# Patient Record
Sex: FEMALE | Race: WHITE | Employment: UNEMPLOYED | URBAN - METROPOLITAN AREA
[De-identification: names, ages, dates, MRNs, and addresses within clinical notes are randomized per-mention and may not be internally consistent; named-entity substitution may affect disease eponyms.]

---

## 2017-03-13 ENCOUNTER — OFFICE VISIT (OUTPATIENT)
Dept: INTERNAL MEDICINE CLINIC | Facility: CLINIC | Age: 37
End: 2017-03-13

## 2017-03-13 VITALS
SYSTOLIC BLOOD PRESSURE: 116 MMHG | BODY MASS INDEX: 26.24 KG/M2 | HEART RATE: 73 BPM | WEIGHT: 139 LBS | HEIGHT: 61 IN | DIASTOLIC BLOOD PRESSURE: 78 MMHG | OXYGEN SATURATION: 98 %

## 2017-03-13 DIAGNOSIS — B35.1 ONYCHOMYCOSIS: ICD-10-CM

## 2017-03-13 DIAGNOSIS — L50.9 HIVES: Primary | ICD-10-CM

## 2017-03-13 DIAGNOSIS — B08.5 ENTEROVIRAL VESICULAR PHARYNGITIS: ICD-10-CM

## 2017-03-13 DIAGNOSIS — Z00.00 ANNUAL PHYSICAL EXAM: ICD-10-CM

## 2017-03-13 PROCEDURE — 82785 ASSAY OF IGE: CPT | Performed by: FAMILY MEDICINE

## 2017-03-13 PROCEDURE — 99213 OFFICE O/P EST LOW 20 MIN: CPT | Performed by: FAMILY MEDICINE

## 2017-03-13 PROCEDURE — 36415 COLL VENOUS BLD VENIPUNCTURE: CPT | Performed by: FAMILY MEDICINE

## 2017-03-13 PROCEDURE — 86003 ALLG SPEC IGE CRUDE XTRC EA: CPT | Performed by: FAMILY MEDICINE

## 2017-03-13 NOTE — PROGRESS NOTES
CC:  Derm Problem      Hx of CC:  1-2 WEEKS WITH ITCHING AND RED PATCHES ON LEFT TEMPLE AREA AND NECK. NO KNOWN TRIGGER.   RESOLVING URI/PHARYNGITIS  THICKENED DISCOLORED RIGHT MID TOENAIL    Vitals:    03/13/17  1205   BP: 116/78   Pulse: 73   Height: 61\

## 2017-03-16 LAB
CLAM IGE QN: <0.1 KUA/L (ref ?–0.1)
CODFISH IGE QN: <0.1 KUA/L (ref ?–0.1)
CORN IGE QN: <0.1 KUA/L (ref ?–0.1)
COW MILK IGE QN: <0.1 KUA/L (ref ?–0.1)
EGG WHITE IGE QN: <0.1 KUA/L (ref ?–0.1)
IGE SERPL-ACNC: 6.36 KU/L (ref 2–214)
PEANUT IGE QN: <0.1 KUA/L (ref ?–0.1)
SCALLOP IGE QN: <0.1 KUA/L (ref ?–0.1)
SESAME SEED IGE QN: <0.1 KUA/L (ref ?–0.1)
SHRIMP IGE QN: <0.1 KUA/L (ref ?–0.1)
SOYBEAN IGE QN: <0.1 KUA/L (ref ?–0.1)
WALNUT IGE QN: <0.1 KUA/L (ref ?–0.1)
WHEAT IGE QN: <0.1 KUA/L (ref ?–0.1)

## 2017-12-26 ENCOUNTER — OFFICE VISIT (OUTPATIENT)
Dept: INTERNAL MEDICINE CLINIC | Facility: CLINIC | Age: 37
End: 2017-12-26

## 2017-12-26 VITALS
SYSTOLIC BLOOD PRESSURE: 112 MMHG | OXYGEN SATURATION: 99 % | WEIGHT: 140 LBS | HEART RATE: 71 BPM | RESPIRATION RATE: 16 BRPM | HEIGHT: 61 IN | DIASTOLIC BLOOD PRESSURE: 62 MMHG | BODY MASS INDEX: 26.43 KG/M2

## 2017-12-26 DIAGNOSIS — F33.8 SEASONAL AFFECTIVE DISORDER (HCC): ICD-10-CM

## 2017-12-26 DIAGNOSIS — R53.82 CHRONIC FATIGUE: ICD-10-CM

## 2017-12-26 DIAGNOSIS — Z00.00 ANNUAL PHYSICAL EXAM: ICD-10-CM

## 2017-12-26 DIAGNOSIS — Z30.09 FAMILY PLANNING: Primary | ICD-10-CM

## 2017-12-26 PROCEDURE — 99213 OFFICE O/P EST LOW 20 MIN: CPT | Performed by: FAMILY MEDICINE

## 2017-12-27 PROBLEM — F33.8 SEASONAL AFFECTIVE DISORDER (HCC): Status: ACTIVE | Noted: 2017-12-27

## 2017-12-27 PROBLEM — F33.8 SEASONAL AFFECTIVE DISORDER: Status: ACTIVE | Noted: 2017-12-27

## 2017-12-27 NOTE — PROGRESS NOTES
CC:  Contraception (Pt presents to clinic for birth control consult-->requesting Dasetta pill.)      Hx of CC:  ON ABOVE. LAST PAP ABOUT 2.5 YEARS AGO AT [facility]--NORMAL. SOMEWHAT \"BLAH\" AND TIRED-->MORE SO DURING CHRISTENSEN.   NO SIGNIFICANT FAMILY/LIFE

## 2019-01-07 ENCOUNTER — OFFICE VISIT (OUTPATIENT)
Dept: INTERNAL MEDICINE CLINIC | Facility: CLINIC | Age: 39
End: 2019-01-07
Payer: COMMERCIAL

## 2019-01-07 VITALS
SYSTOLIC BLOOD PRESSURE: 110 MMHG | HEIGHT: 61 IN | RESPIRATION RATE: 17 BRPM | WEIGHT: 134 LBS | BODY MASS INDEX: 25.3 KG/M2 | DIASTOLIC BLOOD PRESSURE: 70 MMHG | OXYGEN SATURATION: 100 % | HEART RATE: 91 BPM

## 2019-01-07 DIAGNOSIS — Z30.09 FAMILY PLANNING: ICD-10-CM

## 2019-01-07 DIAGNOSIS — Z01.419 ENCOUNTER FOR GYNECOLOGICAL EXAMINATION WITHOUT ABNORMAL FINDING: Primary | ICD-10-CM

## 2019-01-07 PROCEDURE — 87591 N.GONORRHOEAE DNA AMP PROB: CPT | Performed by: FAMILY MEDICINE

## 2019-01-07 PROCEDURE — 99395 PREV VISIT EST AGE 18-39: CPT | Performed by: FAMILY MEDICINE

## 2019-01-07 PROCEDURE — 87491 CHLMYD TRACH DNA AMP PROBE: CPT | Performed by: FAMILY MEDICINE

## 2019-01-08 LAB
C TRACH DNA SPEC QL NAA+PROBE: NEGATIVE
N GONORRHOEA DNA SPEC QL NAA+PROBE: NEGATIVE
PAP HISTORY (OTHER THAN LAST PAP): NORMAL

## 2019-01-09 NOTE — PROGRESS NOTES
HPI:   Monique Evans is a 45year old female who presents for a complete physical exam. NOT FASTING CURRENTLY  Last pap:  > 3 YEARS AGO   Menses:  MONTHLY  Contraception:  BCP  Family hx of breast, ovarian, cervical or colon CA:  NONE        Wt Readings from Pulse 91   Resp 17   Ht 61\"   Wt 134 lb   LMP 12/20/2018   SpO2 100%   BMI 25.32 kg/m²   Body mass index is 25.32 kg/m².    GENERAL: well developed, well nourished,in no apparent distress  SKIN: no rashes,no suspicious lesions  HEENT: atraumatic, normoceph

## 2020-03-05 ENCOUNTER — OFFICE VISIT (OUTPATIENT)
Dept: INTERNAL MEDICINE CLINIC | Facility: CLINIC | Age: 40
End: 2020-03-05
Payer: COMMERCIAL

## 2020-03-05 VITALS
HEART RATE: 71 BPM | WEIGHT: 139 LBS | HEIGHT: 61 IN | DIASTOLIC BLOOD PRESSURE: 66 MMHG | OXYGEN SATURATION: 98 % | RESPIRATION RATE: 17 BRPM | BODY MASS INDEX: 26.24 KG/M2 | SYSTOLIC BLOOD PRESSURE: 108 MMHG

## 2020-03-05 DIAGNOSIS — M75.81 PECTORALIS MAJOR TENDINITIS, RIGHT: ICD-10-CM

## 2020-03-05 DIAGNOSIS — N64.4 MASTALGIA IN FEMALE: Primary | ICD-10-CM

## 2020-03-05 PROCEDURE — 99213 OFFICE O/P EST LOW 20 MIN: CPT | Performed by: FAMILY MEDICINE

## 2020-09-08 ENCOUNTER — OFFICE VISIT (OUTPATIENT)
Dept: INTERNAL MEDICINE CLINIC | Facility: CLINIC | Age: 40
End: 2020-09-08
Payer: COMMERCIAL

## 2020-09-08 VITALS
HEART RATE: 72 BPM | OXYGEN SATURATION: 98 % | BODY MASS INDEX: 28.25 KG/M2 | SYSTOLIC BLOOD PRESSURE: 128 MMHG | WEIGHT: 149.63 LBS | HEIGHT: 61 IN | DIASTOLIC BLOOD PRESSURE: 77 MMHG

## 2020-09-08 DIAGNOSIS — Z00.00 ANNUAL PHYSICAL EXAM: ICD-10-CM

## 2020-09-08 DIAGNOSIS — Z12.31 VISIT FOR SCREENING MAMMOGRAM: Primary | ICD-10-CM

## 2020-09-08 DIAGNOSIS — N64.4 MASTALGIA IN FEMALE: ICD-10-CM

## 2020-09-08 PROCEDURE — 3008F BODY MASS INDEX DOCD: CPT | Performed by: FAMILY MEDICINE

## 2020-09-08 PROCEDURE — 3074F SYST BP LT 130 MM HG: CPT | Performed by: FAMILY MEDICINE

## 2020-09-08 PROCEDURE — 99213 OFFICE O/P EST LOW 20 MIN: CPT | Performed by: FAMILY MEDICINE

## 2020-09-08 PROCEDURE — 3078F DIAST BP <80 MM HG: CPT | Performed by: FAMILY MEDICINE

## 2020-09-08 NOTE — PROGRESS NOTES
CC:  Breast Pain (Pt presents for c/c bilat breast pain x a few months)      Hx of CC:  SEVERAL MONTHS WITH EPISODIC BILATERAL BREAST TENDERNESS-->HAS NOT PAID ATTENTION IF SYMPTOMS CYCLICAL OR MID-CYCLE.       Vitals:    09/08/20  1315   BP: 128/77   Pulse

## 2022-10-31 ENCOUNTER — OFFICE VISIT (OUTPATIENT)
Dept: INTERNAL MEDICINE CLINIC | Facility: CLINIC | Age: 42
End: 2022-10-31
Payer: COMMERCIAL

## 2022-10-31 VITALS
OXYGEN SATURATION: 98 % | RESPIRATION RATE: 15 BRPM | BODY MASS INDEX: 25.3 KG/M2 | TEMPERATURE: 98 F | HEIGHT: 61 IN | SYSTOLIC BLOOD PRESSURE: 114 MMHG | DIASTOLIC BLOOD PRESSURE: 66 MMHG | HEART RATE: 71 BPM | WEIGHT: 134 LBS

## 2022-10-31 DIAGNOSIS — E66.3 OVERWEIGHT (BMI 25.0-29.9): ICD-10-CM

## 2022-10-31 DIAGNOSIS — Z23 NEED FOR INFLUENZA VACCINATION: ICD-10-CM

## 2022-10-31 DIAGNOSIS — R10.84 GENERALIZED ABDOMINAL PAIN: ICD-10-CM

## 2022-10-31 DIAGNOSIS — L98.9 SKIN LESION: ICD-10-CM

## 2022-10-31 DIAGNOSIS — Z00.00 HEALTH MAINTENANCE EXAMINATION: Primary | ICD-10-CM

## 2022-10-31 DIAGNOSIS — E78.00 PURE HYPERCHOLESTEROLEMIA: ICD-10-CM

## 2022-10-31 PROBLEM — F33.8 SEASONAL AFFECTIVE DISORDER: Status: RESOLVED | Noted: 2017-12-27 | Resolved: 2022-10-31

## 2022-10-31 PROBLEM — R10.10 PAIN OF UPPER ABDOMEN: Status: ACTIVE | Noted: 2022-10-31

## 2022-10-31 PROBLEM — F33.8 SEASONAL AFFECTIVE DISORDER (HCC): Status: RESOLVED | Noted: 2017-12-27 | Resolved: 2022-10-31

## 2022-10-31 LAB
ALBUMIN SERPL-MCNC: 3.8 G/DL (ref 3.4–5)
ALBUMIN/GLOB SERPL: 0.9 {RATIO} (ref 1–2)
ALP LIVER SERPL-CCNC: 92 U/L
ALT SERPL-CCNC: 62 U/L
ANION GAP SERPL CALC-SCNC: 9 MMOL/L (ref 0–18)
AST SERPL-CCNC: 31 U/L (ref 15–37)
BASOPHILS # BLD AUTO: 0.04 X10(3) UL (ref 0–0.2)
BASOPHILS NFR BLD AUTO: 0.4 %
BILIRUB SERPL-MCNC: 0.4 MG/DL (ref 0.1–2)
BUN BLD-MCNC: 14 MG/DL (ref 7–18)
BUN/CREAT SERPL: 20.6 (ref 10–20)
CALCIUM BLD-MCNC: 9.2 MG/DL (ref 8.5–10.1)
CHLORIDE SERPL-SCNC: 104 MMOL/L (ref 98–112)
CHOLEST SERPL-MCNC: 197 MG/DL (ref ?–200)
CO2 SERPL-SCNC: 24 MMOL/L (ref 21–32)
CREAT BLD-MCNC: 0.68 MG/DL
DEPRECATED RDW RBC AUTO: 48.9 FL (ref 35.1–46.3)
EOSINOPHIL # BLD AUTO: 0.03 X10(3) UL (ref 0–0.7)
EOSINOPHIL NFR BLD AUTO: 0.3 %
ERYTHROCYTE [DISTWIDTH] IN BLOOD BY AUTOMATED COUNT: 13.7 % (ref 11–15)
EST. AVERAGE GLUCOSE BLD GHB EST-MCNC: 111 MG/DL (ref 68–126)
FASTING PATIENT LIPID ANSWER: NO
FASTING STATUS PATIENT QL REPORTED: NO
GFR SERPLBLD BASED ON 1.73 SQ M-ARVRAT: 111 ML/MIN/1.73M2 (ref 60–?)
GLOBULIN PLAS-MCNC: 4.2 G/DL (ref 2.8–4.4)
GLUCOSE BLD-MCNC: 78 MG/DL (ref 70–99)
HBA1C MFR BLD: 5.5 % (ref ?–5.7)
HCT VFR BLD AUTO: 41.7 %
HCV AB SERPL QL IA: NONREACTIVE
HDLC SERPL-MCNC: 74 MG/DL (ref 40–59)
HGB BLD-MCNC: 13.4 G/DL
IMM GRANULOCYTES # BLD AUTO: 0.03 X10(3) UL (ref 0–1)
IMM GRANULOCYTES NFR BLD: 0.3 %
LDLC SERPL CALC-MCNC: 105 MG/DL (ref ?–100)
LYMPHOCYTES # BLD AUTO: 3.31 X10(3) UL (ref 1–4)
LYMPHOCYTES NFR BLD AUTO: 29.4 %
MCH RBC QN AUTO: 30.9 PG (ref 26–34)
MCHC RBC AUTO-ENTMCNC: 32.1 G/DL (ref 31–37)
MCV RBC AUTO: 96.3 FL
MONOCYTES # BLD AUTO: 0.73 X10(3) UL (ref 0.1–1)
MONOCYTES NFR BLD AUTO: 6.5 %
NEUTROPHILS # BLD AUTO: 7.12 X10 (3) UL (ref 1.5–7.7)
NEUTROPHILS # BLD AUTO: 7.12 X10(3) UL (ref 1.5–7.7)
NEUTROPHILS NFR BLD AUTO: 63.1 %
NONHDLC SERPL-MCNC: 123 MG/DL (ref ?–130)
OSMOLALITY SERPL CALC.SUM OF ELEC: 283 MOSM/KG (ref 275–295)
PLATELET # BLD AUTO: 250 10(3)UL (ref 150–450)
POTASSIUM SERPL-SCNC: 3.9 MMOL/L (ref 3.5–5.1)
PROT SERPL-MCNC: 8 G/DL (ref 6.4–8.2)
RBC # BLD AUTO: 4.33 X10(6)UL
SODIUM SERPL-SCNC: 137 MMOL/L (ref 136–145)
TRIGL SERPL-MCNC: 105 MG/DL (ref 30–149)
TSI SER-ACNC: 1.57 MIU/ML (ref 0.36–3.74)
VLDLC SERPL CALC-MCNC: 18 MG/DL (ref 0–30)
WBC # BLD AUTO: 11.3 X10(3) UL (ref 4–11)

## 2022-10-31 PROCEDURE — 83036 HEMOGLOBIN GLYCOSYLATED A1C: CPT | Performed by: FAMILY MEDICINE

## 2022-10-31 PROCEDURE — 3074F SYST BP LT 130 MM HG: CPT | Performed by: FAMILY MEDICINE

## 2022-10-31 PROCEDURE — 3008F BODY MASS INDEX DOCD: CPT | Performed by: FAMILY MEDICINE

## 2022-10-31 PROCEDURE — 86803 HEPATITIS C AB TEST: CPT | Performed by: FAMILY MEDICINE

## 2022-10-31 PROCEDURE — 80050 GENERAL HEALTH PANEL: CPT | Performed by: FAMILY MEDICINE

## 2022-10-31 PROCEDURE — 80061 LIPID PANEL: CPT | Performed by: FAMILY MEDICINE

## 2022-10-31 PROCEDURE — 3078F DIAST BP <80 MM HG: CPT | Performed by: FAMILY MEDICINE

## 2022-10-31 PROCEDURE — 90471 IMMUNIZATION ADMIN: CPT | Performed by: FAMILY MEDICINE

## 2022-10-31 PROCEDURE — 90686 IIV4 VACC NO PRSV 0.5 ML IM: CPT | Performed by: FAMILY MEDICINE

## 2022-10-31 PROCEDURE — 99396 PREV VISIT EST AGE 40-64: CPT | Performed by: FAMILY MEDICINE

## 2022-10-31 RX ORDER — MULTIVIT-MIN/IRON FUM/FOLIC AC 7.5 MG-4
1 TABLET ORAL DAILY
COMMUNITY

## 2022-10-31 RX ORDER — PHENTERMINE HYDROCHLORIDE 30 MG/1
30 CAPSULE ORAL EVERY MORNING
COMMUNITY

## 2022-10-31 RX ORDER — MULTIVIT WITH MINERALS/LUTEIN
1000 TABLET ORAL DAILY
COMMUNITY

## 2022-10-31 NOTE — ASSESSMENT & PLAN NOTE
Exercise and diet advised. CBC, CMP, lipid panel, Hba1c, TSH,  hepatitis C screen  Flu shot today. Advanced directive information provided. Advised COVID vaccine booster - can get after mammogram.  Pap smear deferred - patient reports she will reschedule to get done  Mammogram ordered and scheduled.

## 2022-10-31 NOTE — ASSESSMENT & PLAN NOTE
Check labs. Currently on phentermine provided by outside provider  Explained to her that ideally she should not be on phentermine at this time. Her weight is not significantly elevated enough where I think phentermine would be helpful for her. Instead, I recommend making healthy choices in her life-healthy diet and exercise emphasized. Follow-up as needed.

## 2022-10-31 NOTE — ASSESSMENT & PLAN NOTE
Patient with intermittent abdominal pain and back pain  Examination is unremarkable today  Advised just monitoring her symptoms for now. Can use Tylenol as needed. Healthy diet and exercise discussed.   Follow-up as needed

## 2022-10-31 NOTE — PATIENT INSTRUCTIONS
PATIENT INSTRUCTIONS    Thank you for seeing me today, it was a pleasure taking care of you. Please check out at the  and schedule a follow up appointment. Return in about 2 weeks (around 11/14/2022) for follow up. Please get your labs drawn - you may need to schedule a lab appointment if this was not completed at your recent doctor's visit. I will go over your test results with you when I see you in a few weeks  The following imaging studies were ordered: Mammogram 11/7 at 12 pm at [facility]  Please also follow up with the following specialists: Dermatology  Please fill out the advance directive information (power of  documents) and bring a copy to our clinic.   Focus on healthy diet and exercise  Consider discontinuing phentermine and focusing on lifestyle modifications instead  Monitor your abdominal pain for now, can take tylenol as needed      Best,   Dr. Kev Renee

## 2022-10-31 NOTE — ASSESSMENT & PLAN NOTE
Patient with a papule seen on the right chin that she reports has drained fluid before  She would like a referral to dermatology to further evaluate-provided

## 2022-11-01 PROBLEM — R74.8 ELEVATED LIVER ENZYMES: Status: ACTIVE | Noted: 2022-11-01

## 2022-11-07 ENCOUNTER — HOSPITAL ENCOUNTER (OUTPATIENT)
Dept: MAMMOGRAPHY | Facility: HOSPITAL | Age: 42
Discharge: HOME | End: 2022-11-07
Attending: FAMILY MEDICINE
Payer: COMMERCIAL

## 2022-11-07 DIAGNOSIS — Z00.00 HEALTH MAINTENANCE EXAMINATION: ICD-10-CM

## 2022-11-07 PROCEDURE — 77063 BREAST TOMOSYNTHESIS BI: CPT | Performed by: FAMILY MEDICINE

## 2022-11-07 PROCEDURE — 77067 SCR MAMMO BI INCL CAD: CPT | Performed by: FAMILY MEDICINE

## 2022-11-10 ENCOUNTER — OFFICE VISIT (OUTPATIENT)
Dept: INTERNAL MEDICINE CLINIC | Facility: CLINIC | Age: 42
End: 2022-11-10
Payer: COMMERCIAL

## 2022-11-10 VITALS
RESPIRATION RATE: 16 BRPM | HEIGHT: 61 IN | OXYGEN SATURATION: 98 % | BODY MASS INDEX: 25.3 KG/M2 | DIASTOLIC BLOOD PRESSURE: 72 MMHG | WEIGHT: 134 LBS | TEMPERATURE: 98 F | HEART RATE: 98 BPM | SYSTOLIC BLOOD PRESSURE: 110 MMHG

## 2022-11-10 DIAGNOSIS — Z00.00 HEALTH MAINTENANCE EXAMINATION: ICD-10-CM

## 2022-11-10 DIAGNOSIS — E78.00 PURE HYPERCHOLESTEROLEMIA: ICD-10-CM

## 2022-11-10 DIAGNOSIS — R74.8 ELEVATED LIVER ENZYMES: ICD-10-CM

## 2022-11-10 DIAGNOSIS — H53.9 VISUAL CHANGES: ICD-10-CM

## 2022-11-10 DIAGNOSIS — R10.84 GENERALIZED ABDOMINAL PAIN: ICD-10-CM

## 2022-11-10 DIAGNOSIS — E66.3 OVERWEIGHT (BMI 25.0-29.9): ICD-10-CM

## 2022-11-10 DIAGNOSIS — N64.4 BREAST PAIN, RIGHT: Primary | ICD-10-CM

## 2022-11-10 PROCEDURE — 3078F DIAST BP <80 MM HG: CPT | Performed by: FAMILY MEDICINE

## 2022-11-10 PROCEDURE — 99214 OFFICE O/P EST MOD 30 MIN: CPT | Performed by: FAMILY MEDICINE

## 2022-11-10 PROCEDURE — 3074F SYST BP LT 130 MM HG: CPT | Performed by: FAMILY MEDICINE

## 2022-11-10 PROCEDURE — 3008F BODY MASS INDEX DOCD: CPT | Performed by: FAMILY MEDICINE

## 2022-11-10 NOTE — ASSESSMENT & PLAN NOTE
Patient reports that her vision has been getting slightly worse over the years, now requiring reading glasses. Also has questions about light brown spot on her right eye  Will refer to ophthalmology to evaluate.

## 2022-11-10 NOTE — ASSESSMENT & PLAN NOTE
Patient with intermittent abdominal pain and back pain. Examination more with tenderness in the right lower rib area today. Advised just monitoring her symptoms for now. Can use Tylenol as needed. Healthy diet and exercise discussed.   Follow-up as needed

## 2022-11-10 NOTE — PATIENT INSTRUCTIONS
PATIENT INSTRUCTIONS    Thank you for seeing me today, it was a pleasure taking care of you. Please check out at the  and schedule a follow up appointment. Return in about 1 year (around 11/10/2023) for physical.  Please work on healthy diet and exercise  Monitor the pain in your right breast, hopefully this improves in time.  Can take tylenol or ibuprofen as needed for now  If you feel a lump, can come back and see me so that I can evaluate  Can see eye doctor for evaluation of your vision    Best,  Dr. Gila Gerber

## 2022-11-10 NOTE — ASSESSMENT & PLAN NOTE
1 week worth of breast pain, breast examination unremarkable. Had screening mammogram that was recently normal.  Could be discomfort related to premenstrual symptoms. Could also be musculoskeletal discomfort  Advised can take Tylenol Advil as needed for now.

## 2022-11-15 LAB — HPV MRNA E6/E7: NOT DETECTED

## 2023-01-05 ENCOUNTER — TELEPHONE (OUTPATIENT)
Dept: INTERNAL MEDICINE CLINIC | Facility: CLINIC | Age: 43
End: 2023-01-05

## 2023-01-05 NOTE — TELEPHONE ENCOUNTER
Call received on Triage vmail 23 abt need for same day referral to \"Dr Hassan\" for appt. today. Unintelligble words, no pt name spelling or return PH# for left, only fax # for referral- difficult to identify this patient. Unable to process referral or call patient back.    ________________  PSRs able to guess name from saved vmail. Referral ask may have been for a Dr Suarez Space but unclear since no last name of specialist left in vmail message. Call placed to patient today when Pt/ Lillian 30 # identified. Unable to reach patient. Vmail message left asking for return call to office w/ details on reason for referral need, specialist name. Also advised pt to leave message in future w/ spelled name,  and her PH# to enable a more timely return call. Will await patient's return call or [employer] message with request details.

## 2023-05-15 ENCOUNTER — TELEPHONE (OUTPATIENT)
Dept: INTERNAL MEDICINE CLINIC | Facility: CLINIC | Age: 43
End: 2023-05-15

## 2023-05-15 NOTE — TELEPHONE ENCOUNTER
Spoke with pt as she recently had normal screening mammogram 11/2022. Advised follow up rohan with Dr. John Monterroso to reevaluate breast pain (now bilateral).    Helped make appt for this Friday @ 11:20am.    -Nohemy Reich

## 2023-05-19 ENCOUNTER — OFFICE VISIT (OUTPATIENT)
Dept: INTERNAL MEDICINE CLINIC | Facility: CLINIC | Age: 43
End: 2023-05-19
Payer: COMMERCIAL

## 2023-05-19 VITALS
WEIGHT: 134 LBS | BODY MASS INDEX: 25.3 KG/M2 | OXYGEN SATURATION: 99 % | HEIGHT: 61 IN | RESPIRATION RATE: 17 BRPM | DIASTOLIC BLOOD PRESSURE: 70 MMHG | HEART RATE: 62 BPM | SYSTOLIC BLOOD PRESSURE: 106 MMHG

## 2023-05-19 DIAGNOSIS — R10.84 GENERALIZED ABDOMINAL PAIN: ICD-10-CM

## 2023-05-19 DIAGNOSIS — N64.4 BREAST PAIN, RIGHT: Primary | ICD-10-CM

## 2023-05-19 DIAGNOSIS — R74.8 ELEVATED LIVER ENZYMES: ICD-10-CM

## 2023-05-19 DIAGNOSIS — H53.9 VISUAL CHANGES: ICD-10-CM

## 2023-05-19 DIAGNOSIS — E66.3 OVERWEIGHT (BMI 25.0-29.9): ICD-10-CM

## 2023-05-19 NOTE — ASSESSMENT & PLAN NOTE
Still with intermittent breast pain. Breast examination still unremarkable. Had screening mammogram that was recently normal.  Could be discomfort related to premenstrual symptoms. Could also be musculoskeletal discomfort  Advised can take Tylenol Advil as needed for now.   Advised if with new mass, see me in the office and I will order another mammogram.  Otherwise plan to repeat screening mammogram in November

## 2023-05-19 NOTE — PATIENT INSTRUCTIONS
PATIENT INSTRUCTIONS    Thank you for seeing me today, it was a pleasure taking care of you. Please check out at the  and schedule a follow up appointment.   Return in about 6 months (around 11/19/2023) for physical.    Provider Address Phone   Cristina Saul MD [address] [place] [address] [phone]     Tylenol or advil as needed for pain      Best,  Dr. Norris Kahn

## 2023-05-19 NOTE — ASSESSMENT & PLAN NOTE
Check labs. Currently on phentermine provided by outside provider  Explained to her previously that ideally she should not be on phentermine at this time. Her weight is not significantly elevated enough where I think phentermine would be helpful for her. Instead, I recommend making healthy choices in her life-healthy diet and exercise emphasized. Follow-up as needed.

## 2024-01-10 ENCOUNTER — OFFICE VISIT (OUTPATIENT)
Dept: INTERNAL MEDICINE CLINIC | Facility: CLINIC | Age: 44
End: 2024-01-10
Payer: COMMERCIAL

## 2024-01-10 VITALS
HEART RATE: 71 BPM | SYSTOLIC BLOOD PRESSURE: 124 MMHG | HEIGHT: 61 IN | BODY MASS INDEX: 26.62 KG/M2 | WEIGHT: 141 LBS | TEMPERATURE: 98 F | DIASTOLIC BLOOD PRESSURE: 70 MMHG | OXYGEN SATURATION: 99 %

## 2024-01-10 DIAGNOSIS — L98.9 SKIN LESION: ICD-10-CM

## 2024-01-10 DIAGNOSIS — H53.9 VISUAL CHANGES: ICD-10-CM

## 2024-01-10 DIAGNOSIS — E66.3 OVERWEIGHT (BMI 25.0-29.9): ICD-10-CM

## 2024-01-10 DIAGNOSIS — E78.00 PURE HYPERCHOLESTEROLEMIA: ICD-10-CM

## 2024-01-10 DIAGNOSIS — R74.8 ELEVATED LIVER ENZYMES: ICD-10-CM

## 2024-01-10 DIAGNOSIS — R07.89 RIGHT-SIDED CHEST WALL PAIN: ICD-10-CM

## 2024-01-10 DIAGNOSIS — Z00.00 HEALTH MAINTENANCE EXAMINATION: Primary | ICD-10-CM

## 2024-01-10 LAB
ALBUMIN SERPL-MCNC: 4.3 G/DL (ref 3.2–4.8)
ALBUMIN/GLOB SERPL: 1.3 {RATIO} (ref 1–2)
ALP LIVER SERPL-CCNC: 80 U/L
ALT SERPL-CCNC: 39 U/L
ANION GAP SERPL CALC-SCNC: 7 MMOL/L (ref 0–18)
AST SERPL-CCNC: 30 U/L (ref ?–34)
BASOPHILS # BLD AUTO: 0.04 X10(3) UL (ref 0–0.2)
BASOPHILS NFR BLD AUTO: 0.4 %
BILIRUB SERPL-MCNC: 0.8 MG/DL (ref 0.3–1.2)
BUN BLD-MCNC: 12 MG/DL (ref 9–23)
BUN/CREAT SERPL: 16 (ref 10–20)
CALCIUM BLD-MCNC: 9.1 MG/DL (ref 8.7–10.4)
CHLORIDE SERPL-SCNC: 105 MMOL/L (ref 98–112)
CHOLEST SERPL-MCNC: 237 MG/DL (ref ?–200)
CO2 SERPL-SCNC: 26 MMOL/L (ref 21–32)
CREAT BLD-MCNC: 0.75 MG/DL
DEPRECATED RDW RBC AUTO: 46.6 FL (ref 35.1–46.3)
EGFRCR SERPLBLD CKD-EPI 2021: 101 ML/MIN/1.73M2 (ref 60–?)
EOSINOPHIL # BLD AUTO: 0.04 X10(3) UL (ref 0–0.7)
EOSINOPHIL NFR BLD AUTO: 0.4 %
ERYTHROCYTE [DISTWIDTH] IN BLOOD BY AUTOMATED COUNT: 13.4 % (ref 11–15)
EST. AVERAGE GLUCOSE BLD GHB EST-MCNC: 108 MG/DL (ref 68–126)
FASTING PATIENT LIPID ANSWER: YES
FASTING STATUS PATIENT QL REPORTED: YES
GLOBULIN PLAS-MCNC: 3.4 G/DL (ref 2.8–4.4)
GLUCOSE BLD-MCNC: 85 MG/DL (ref 70–99)
HBA1C MFR BLD: 5.4 % (ref ?–5.7)
HCT VFR BLD AUTO: 40.6 %
HDLC SERPL-MCNC: 78 MG/DL (ref 40–59)
HGB BLD-MCNC: 13.5 G/DL
IMM GRANULOCYTES # BLD AUTO: 0.03 X10(3) UL (ref 0–1)
IMM GRANULOCYTES NFR BLD: 0.3 %
LDLC SERPL CALC-MCNC: 143 MG/DL (ref ?–100)
LYMPHOCYTES # BLD AUTO: 3.05 X10(3) UL (ref 1–4)
LYMPHOCYTES NFR BLD AUTO: 29.6 %
MCH RBC QN AUTO: 31.5 PG (ref 26–34)
MCHC RBC AUTO-ENTMCNC: 33.3 G/DL (ref 31–37)
MCV RBC AUTO: 94.9 FL
MONOCYTES # BLD AUTO: 0.81 X10(3) UL (ref 0.1–1)
MONOCYTES NFR BLD AUTO: 7.9 %
NEUTROPHILS # BLD AUTO: 6.32 X10 (3) UL (ref 1.5–7.7)
NEUTROPHILS # BLD AUTO: 6.32 X10(3) UL (ref 1.5–7.7)
NEUTROPHILS NFR BLD AUTO: 61.4 %
NONHDLC SERPL-MCNC: 159 MG/DL (ref ?–130)
OSMOLALITY SERPL CALC.SUM OF ELEC: 285 MOSM/KG (ref 275–295)
PLATELET # BLD AUTO: 238 10(3)UL (ref 150–450)
POTASSIUM SERPL-SCNC: 3.9 MMOL/L (ref 3.5–5.1)
PROT SERPL-MCNC: 7.7 G/DL (ref 5.7–8.2)
RBC # BLD AUTO: 4.28 X10(6)UL
SODIUM SERPL-SCNC: 138 MMOL/L (ref 136–145)
TRIGL SERPL-MCNC: 93 MG/DL (ref 30–149)
VLDLC SERPL CALC-MCNC: 17 MG/DL (ref 0–30)
WBC # BLD AUTO: 10.3 X10(3) UL (ref 4–11)

## 2024-01-10 PROCEDURE — 83036 HEMOGLOBIN GLYCOSYLATED A1C: CPT | Performed by: FAMILY MEDICINE

## 2024-01-10 PROCEDURE — 80053 COMPREHEN METABOLIC PANEL: CPT | Performed by: FAMILY MEDICINE

## 2024-01-10 PROCEDURE — 80061 LIPID PANEL: CPT | Performed by: FAMILY MEDICINE

## 2024-01-10 PROCEDURE — 85025 COMPLETE CBC W/AUTO DIFF WBC: CPT | Performed by: FAMILY MEDICINE

## 2024-01-10 NOTE — PATIENT INSTRUCTIONS
PATIENT INSTRUCTIONS    Thank you for seeing me today, it was a pleasure taking care of you.  Please check out at the  and schedule a follow up appointment.  Return in about 1 year (around 1/10/2025) for physical.  Please remember that the luis alfredo period for all appointments is 5 minutes. This is to help maximize the amount of time that we can spend together at our visits.    Please get your labs drawn - you may need to schedule a lab appointment if this was not completed at your recent doctor's visit.  The following imaging studies were ordered: Mammogram  Please call [phone] to schedule your imaging appointment.   Please also follow up with the following specialists: Dermatology, Ophthalmology   Please fill out the advance directive information (power of  documents) and bring a copy to our clinic.  COVID booster  Go to physical therapy  Take tylenol or advil as needed    Best,   Dr. Quezada

## 2024-01-10 NOTE — PROGRESS NOTES
FAMILY MEDICINE CLINIC NOTE    HPI  Milla Grayson is a 43 year old female presenting for physical    #Health Maintenance  -Diet: A lot of fried, a lot of fatty foods  -Exercise: None  -Lung cancer screen: Not indicated  -Colon cancer screen: Not indicated  -Statin:  Will check lipid panel  -ASA: Not indicated  -Breast cancer screen: Indicated  -Breast cancer medication to reduce risk: Declines  -Periods: Regular  -Cervical cancer screen: 11/2022 normal pap, HPV negative  -DEXA: Not indicated  -BRCA: Not indicated  -Intimate partner violence: Denies abuse  -HIV screen: - 6/2012 negative  -Hep C screen: - 10/2022 negative   -Gonorrhea/chlamydia:  Not Indicated  -Syphillis: Not indicated  -TB: Not indicated  -Tobacco/alcohol: Per below  -Depression: PHQ-2 score of 0 (score >/= 3 do PHQ-9)  -Advanced Directive: Indicated       #Immunizations  -Tdap: 5/2014  -Flu shot: Indicated  -PCV13: Not indicated   -PCV20: Not indicated   -PPSV23: Not indicated   -HPV: Not indicated  -RZV (preferred) or VZL: Not indicated  -RSV: Not indicated  -COVID: Pfizer        #Skin  -papule on right chin  -saw dermatology in the past - was told a cyst  -has grown again     #R chest wall pain  11/2022  -1 week  -reports soon getting period  -R breast  -no lumps  -screening mammogram 11/2022 was normal     5/2023  -intermittent, still coming and going  -note related to cycle  -no pain when sitting  -but when pressure is applied - lying, pressing  -right breast, sometimes left - but mild  -mammo 11/2022 - CONCLUSION:   No mammographic evidence of malignancy.     1/2024  -intermittent discomfort  -no lumps or bumps in breast  -notes discomfort with sleeping      #Elevated liver enzymes  -CMP 10/2022 ALT 62      #Overweight  -reports getting phentermine 30 mg daily  -started 10 years ago     #Visual change  -reports vision has changed over the years  -now has to wear some reading glasses  -also questions about hyperpigmentation seen on right  eye  -referred to opthalmology    #Patient Care Team  Patient Care Team:  Tawanda Quezada MD as PCP - General (Family Medicine)    ROS  GENERAL: No fever/chills, no recent weight loss   HEENT: No visual changes, no changes in hearing, no sore throats  NECK: No pain, no swelling  RESP: No cough, no SOB  CV: No chest pain, no palpitations  GI: No abd pain, no N/V/D  MSK: No edema, +right chest wall pain  SKIN: No new rashes  NEURO: No numbness, no tingling, no HA    HEALTH MAINTENANCE CHECKLIST  Health Maintenance Topics with due status: Overdue       Topic Date Due    COVID-19 Vaccine 09/01/2023    Influenza Vaccine 10/01/2023    Annual Physical 10/31/2023    Mammogram 11/07/2023    Annual Depression Screening 01/01/2024       ALLERGIES  No Known Allergies    MEDICATIONS  Current Outpatient Medications   Medication Sig Dispense Refill    Phentermine HCl 30 MG Oral Cap Take 30 mg by mouth every morning.      Multiple Vitamins-Minerals (MULTI-VITAMIN/MINERALS) Oral Tab Take 1 tablet by mouth in the morning.      Ascorbic Acid (VITAMIN C) 1000 MG Oral Tab Take 1,000 mg by mouth in the morning.         ACTIVE PROBLEM  Patient Active Problem List   Diagnosis    Pure hypercholesterolemia    Health maintenance examination    Skin lesion    Overweight (BMI 25.0-29.9)    Elevated liver enzymes    Right-sided chest wall pain    Visual changes       PAST MEDICAL HISTORY  Past Medical History:   Diagnosis Date    Ovarian cyst 01/01/2005    ovarian cyst with 1st pregnancy - removed    Pure hypercholesterolemia 10/31/2022       PAST SOCIAL HISTORY  Social History     Socioeconomic History    Marital status:      Spouse name: Not on file    Number of children: Not on file    Years of education: Not on file    Highest education level: Not on file   Occupational History    Not on file   Tobacco Use    Smoking status: Never    Smokeless tobacco: Never   Vaping Use    Vaping Use: Not on file   Substance and Sexual Activity     Alcohol use: No    Drug use: No    Sexual activity: Yes     Partners: Male   Other Topics Concern    Not on file   Social History Narrative    Relationships:  - Norman*     Children: 2 boys - Norman (18M), Cameron (16M)    Pets: None    School: N/A    Work:  for [employer]    Origin: From [place]. Came to [place] in .     Interests: Enjoys being at home, listening at home, watching movies.     Spiritual: Nondenominational - important.          Social Determinants of Health     Financial Resource Strain: Not on file   Food Insecurity: Not on file   Transportation Needs: Not on file   Physical Activity: Not on file   Stress: Not on file   Social Connections: Not on file   Housing Stability: Not on file       PAST SURGICAL HISTORY  Past Surgical History:   Procedure Laterality Date    APPENDECTOMY  1998      ,     CHOLECYSTECTOMY  2012    REMOVAL OF OVARIAN CYST(S)         PAST FAMILY HISTORY  Family History   Problem Relation Age of Onset    Hypertension Mother     Hyperlipidemia Mother     Hypertension Father     Hyperlipidemia Father     No Known Problems Sister     No Known Problems Sister     No Known Problems Maternal Grandmother     Cancer Maternal Grandfather         Stomach    Cancer Paternal Grandmother         Unspecified type    No Known Problems Paternal Grandfather     Colon Cancer Neg     Breast Cancer Neg     Ovarian Cancer Neg        PHYSICAL EXAM  Vitals:    01/10/24 1023   BP: 124/70   Pulse: 71   Temp: 98.2 °F (36.8 °C)   SpO2: 99%   Weight: 141 lb (64 kg)   Height: 5' 1\" (1.549 m)      Body mass index is 26.64 kg/m².    GENERAL: NAD  HEENT: Moist mucous membranes, no tonsillar swelling or erythema, PERRLA bilat, R eye with light brown spot seen in the lateral lower scleara , TM translucent and non-bulging  NECK: Supple, non-tender  RESP: CTAB, no wheezing, no rales, no ronchi  CV: RRR, no murmurs  GI: Soft, non-distended, non-tender, no guarding, no  rebound, no masses  MSK: No edema. Mild tenderness palpation in the right lateral chest wall  BREAST: Breasts are symmetric.  No palpable mass bilaterally.  No drainage of the nipple.  No axillary lymphadenopathy.    SKIN: Warm and dry, on the right chin there is a blue subcutaneous cystic nodule  NEURO: Answering questions appropriately    LABS  Lab Results   Component Value Date    WBC 11.3 (H) 10/31/2022    HGB 13.4 10/31/2022    HCT 41.7 10/31/2022    .0 10/31/2022    NEPERCENT 63.1 10/31/2022    LYPERCENT 29.4 10/31/2022    MOPERCENT 6.5 10/31/2022    EOPERCENT 0.3 10/31/2022    BAPERCENT 0.4 10/31/2022    NE 7.12 10/31/2022    LYMABS 3.31 10/31/2022    MOABSO 0.73 10/31/2022    EOABSO 0.03 10/31/2022    BAABSO 0.04 10/31/2022       Lab Results   Component Value Date     10/31/2022    K 3.9 10/31/2022     10/31/2022    CO2 24.0 10/31/2022    ANIONGAP 9 10/31/2022    BUN 14 10/31/2022    CREATSERUM 0.68 10/31/2022    BUNCREA 20.6 (H) 10/31/2022    GLU 78 10/31/2022    CA 9.2 10/31/2022    OSMOCALC 283 10/31/2022    ALT 62 (H) 10/31/2022    AST 31 10/31/2022    ALKPHO 92 10/31/2022    BILT 0.4 10/31/2022    TP 8.0 10/31/2022    ALB 3.8 10/31/2022    GLOBULIN 4.2 10/31/2022    ELECTAG 0.9 (L) 10/31/2022    FASTING No 10/31/2022    FASTING No 10/31/2022         Lab Results   Component Value Date    CHOLEST 197 10/31/2022    TRIG 105 10/31/2022    HDL 74 (H) 10/31/2022     (H) 10/31/2022    VLDL 18 10/31/2022    NONHDLC 123 10/31/2022        DIAGNOSTICS    ASSESSMENT/PLAN  Problem List Items Addressed This Visit          Cardiac and Vasculature    Pure hypercholesterolemia     Healthy diet and exercise advised.  Can check CMP and lipid panel.          Right-sided chest wall pain     Still with intermittent right sided chest wall pain.  No pain in the breast tissue - mild tenderness to deep palpation of the right lateral chest wall.   Breast examination still unremarkable.  Suspect  musculoskeletal etiology.   Advised can take Tylenol Advil as needed for now.  Going on for several years now.   Referral to physical therapy provided.          Relevant Orders    Physical Therapy Referral - [facility]       Endocrine and Metabolic    Overweight (BMI 25.0-29.9)     Check labs.  Currently on phentermine provided by outside provider  Follow-up as needed.         Relevant Orders    Comp Metabolic Panel (14)    Lipid Panel    Hemoglobin A1C       Gastrointestinal and Abdominal    Elevated liver enzymes     Healthy diet and exercise advised.  Will monitor LFTs moving forward.            Eye    Visual changes     Patient reports that her vision has been getting slightly worse over the years, now requiring reading glasses.  Also has questions about light brown spot on her right eye  Will refer to ophthalmology to evaluate.         Relevant Orders    OPHTHALMOLOGY - INTERNAL       Skin    Skin lesion     Patient with a papule seen on the right chin that she reports has drained fluid before  States that she saw a dermatologist previously and was told it was a cyst that needs to drained.  Referral to dermatology provided again         Relevant Orders    DERM - INTERNAL       Health Encounters    Health maintenance examination - Primary     Exercise and diet advised.  CBC, CMP, lipid panel, Hba1c  Flu shot today.  Advanced directive information provided.  Advised COVID vaccine.  Mammogram ordered.           Relevant Orders    CBC With Differential With Platelet    Comp Metabolic Panel (14)    Lipid Panel    Hemoglobin A1C    FLULAVAL INFLUENZA VACCINE QUAD PRESERVATIVE FREE 0.5 ML    [facility] IRENE 2D+3D SCREENING BILAT (CPT=77067/32802)       Return in about 1 year (around 1/10/2025) for physical.    Tawanda Quezada MD  Family Medicine

## 2024-01-10 NOTE — ASSESSMENT & PLAN NOTE
Still with intermittent right sided chest wall pain.  No pain in the breast tissue - mild tenderness to deep palpation of the right lateral chest wall.   Breast examination still unremarkable.  Suspect musculoskeletal etiology.   Advised can take Tylenol Advil as needed for now.  Going on for several years now.   Referral to physical therapy provided.

## 2024-01-10 NOTE — ASSESSMENT & PLAN NOTE
Exercise and diet advised.  CBC, CMP, lipid panel, Hba1c  Flu shot today.  Advanced directive information provided.  Advised COVID vaccine.  Mammogram ordered.

## 2024-01-10 NOTE — ASSESSMENT & PLAN NOTE
Patient with a papule seen on the right chin that she reports has drained fluid before  States that she saw a dermatologist previously and was told it was a cyst that needs to drained.  Referral to dermatology provided again

## 2024-01-24 ENCOUNTER — OFFICE VISIT (OUTPATIENT)
Dept: DERMATOLOGY CLINIC | Facility: CLINIC | Age: 44
End: 2024-01-24
Payer: COMMERCIAL

## 2024-01-24 DIAGNOSIS — L72.0 EPIDERMOID CYST: Primary | ICD-10-CM

## 2024-01-24 PROCEDURE — 99203 OFFICE O/P NEW LOW 30 MIN: CPT | Performed by: STUDENT IN AN ORGANIZED HEALTH CARE EDUCATION/TRAINING PROGRAM

## 2024-01-24 NOTE — PROGRESS NOTES
New Patient    Referred by: No referring provider defined for this encounter.    CHIEF COMPLAINT: Lesion of concern     HISTORY OF PRESENT ILLNESS: Milla Grayson is a 43 year old female here for evaluation of lesion of concern.    1. Growth   Location: Right Lower Cheek  Duration: 1.5 years  Signs and symptoms: None  Current treatment: None  Past treatments: None    Personal Dermatologic History  History of skin cancer: No  History of  atypical moles: No    FAMILY HISTORY:  History of melanoma: No    Past Medical History  Past Medical History:   Diagnosis Date    Ovarian cyst 01/01/2005    ovarian cyst with 1st pregnancy - removed    Pure hypercholesterolemia 10/31/2022       REVIEW OF SYSTEMS:  Constitutional: Denies fever, chills, unintentional weight loss.   Skin as per HPI    Medications  Current Outpatient Medications   Medication Sig Dispense Refill    Phentermine HCl 30 MG Oral Cap Take 30 mg by mouth every morning.      Multiple Vitamins-Minerals (MULTI-VITAMIN/MINERALS) Oral Tab Take 1 tablet by mouth in the morning.      Ascorbic Acid (VITAMIN C) 1000 MG Oral Tab Take 1,000 mg by mouth in the morning.         PHYSICAL EXAM:  General: awake, alert, no acute distress  Neuropsych: appropriate mood and affect  Eyes: Sclerae anicteric, without conjunctival injection, eyelids unremarkable  Skin: Skin exam was performed today including the following: R cheek. Pertinent findings include:   - with subcutaneous nodule    ASSESSMENT & PLAN:  Pathophysiology of diagnoses discussed with patient.  Therapeutic options reviewed. Risks, benefits, and alternatives discussed with patient. Instructions reviewed at length.    #Epidermoid cyst   - Discussed benign nature. Referral for excision. Risks, benefits, and alternatives discussed with patient.      Return to clinic: as needed or sooner if something concerning arises     The note was routed electronically to the referring physician.    Mo Mcdonald MD

## 2024-02-02 ENCOUNTER — OFFICE VISIT (OUTPATIENT)
Dept: OTOLARYNGOLOGY | Facility: CLINIC | Age: 44
End: 2024-02-02
Payer: COMMERCIAL

## 2024-02-02 VITALS — HEIGHT: 61 IN | WEIGHT: 141 LBS | BODY MASS INDEX: 26.62 KG/M2

## 2024-02-02 DIAGNOSIS — L72.0 EPIDERMAL CYST OF FACE: Primary | ICD-10-CM

## 2024-02-02 PROCEDURE — 3008F BODY MASS INDEX DOCD: CPT | Performed by: OTOLARYNGOLOGY

## 2024-02-02 PROCEDURE — 99203 OFFICE O/P NEW LOW 30 MIN: CPT | Performed by: OTOLARYNGOLOGY

## 2024-02-03 NOTE — PROGRESS NOTES
Milla Grayson is a 43 year old female.    Chief Complaint   Patient presents with    Consult     Epidermoid cyst right jaw       HISTORY OF PRESENT ILLNESS  She presents with a history of enlarging cystic structure just above the jawline on the right for several years.  States that it has grown significant in the past year.  No drainage no bleeding signs or symptoms.  Wishes to discuss having it removed.  Sent by Dr. Mcdonald for my opinion regarding management of this facial lesion.      Social History     Socioeconomic History    Marital status:    Tobacco Use    Smoking status: Never    Smokeless tobacco: Never   Substance and Sexual Activity    Alcohol use: No    Drug use: No    Sexual activity: Yes     Partners: Male   Other Topics Concern    Breast feeding No    Reaction to local anesthetic No    Pt has a pacemaker No    Pt has a defibrillator No       Family History   Problem Relation Age of Onset    Hypertension Mother     Hyperlipidemia Mother     Hypertension Father     Hyperlipidemia Father     No Known Problems Sister     No Known Problems Sister     No Known Problems Maternal Grandmother     Cancer Maternal Grandfather         Stomach    Cancer Paternal Grandmother         Unspecified type    No Known Problems Paternal Grandfather     Colon Cancer Neg     Breast Cancer Neg     Ovarian Cancer Neg        Past Medical History:   Diagnosis Date    Ovarian cyst 2005    ovarian cyst with 1st pregnancy - removed    Pure hypercholesterolemia 10/31/2022       Past Surgical History:   Procedure Laterality Date    APPENDECTOMY  1998      , 2007    CHOLECYSTECTOMY  2012    REMOVAL OF OVARIAN CYST(S)           REVIEW OF SYSTEMS    System Neg/Pos Details   Constitutional Negative Fatigue, fever and weight loss.   ENMT Negative Drooling.   Eyes Negative Blurred vision and vision changes.   Respiratory Negative Dyspnea and wheezing.   Cardio Negative Chest pain, irregular  heartbeat/palpitations and syncope.   GI Negative Abdominal pain and diarrhea.   Endocrine Negative Cold intolerance and heat intolerance.   Neuro Negative Tremors.   Psych Negative Anxiety and depression.   Integumentary Negative Frequent skin infections, pigment change and rash.   Hema/Lymph Negative Easy bleeding and easy bruising.           PHYSICAL EXAM     5' 1\" (1.549 m)   Wt 141 lb (64 kg)   LMP 05/01/2023   BMI 26.64 kg/m²        Constitutional Normal Overall appearance - Normal.   Psychiatric Normal Orientation - Oriented to time, place, person & situation. Appropriate mood and affect.   Neck Exam Normal Inspection - Normal. Palpation - Normal. Parotid gland - Normal. Thyroid gland - Normal.   Eyes Normal Conjunctiva - Right: Normal, Left: Normal. Pupil - Right: Normal, Left: Normal. Fundus - Right: Normal, Left: Normal.   Neurological Normal Memory - Normal. Cranial nerves - Cranial nerves II through XII grossly intact.   Head/Face Normal Facial features - Normal. Eyebrows - Normal. Skull - Normal.        Nasopharynx Normal External nose - Normal. Lips/teeth/gums - Normal. Tonsils - Normal. Oropharynx - Normal.   Ears Normal Inspection - Right: Normal, Left: Normal. Canal - Right: Normal, Left: Normal. TM - Right: Normal, Left: Normal.   Skin Normal Inspection -1 cm cyst right lower facial skin just above the jawline        Lymph Detail Normal Submental. Submandibular. Anterior cervical. Posterior cervical. Supraclavicular.        Nose/Mouth/Throat Normal External nose - Normal. Lips/teeth/gums - Normal. Tonsils - Normal. Oropharynx - Normal.   Nose/Mouth/Throat Normal Nares - Right: Normal Left: Normal. Septum -Normal  Turbinates - Right: Normal, Left: Normal.       Current Outpatient Medications:     Phentermine HCl 30 MG Oral Cap, Take 1 capsule (30 mg total) by mouth every morning., Disp: , Rfl:     Multiple Vitamins-Minerals (MULTI-VITAMIN/MINERALS) Oral Tab, Take 1 tablet by mouth daily., Disp:  , Rfl:     Ascorbic Acid (VITAMIN C) 1000 MG Oral Tab, Take 1 tablet (1,000 mg total) by mouth daily., Disp: , Rfl:   ASSESSMENT AND PLAN    1. Epidermal cyst of face  1 cm cyst of the lower face just above the jawline on the right.  We did discuss excising this under local anesthesia.  She does understand the risk of surgery to include but not be limited to postoperative pain as well as scar formation.  She states understanding of these risks accepts these risks and wishes to proceed.  We will set this up at her convenience        This note was prepared using Dragon Medical voice recognition dictation software. As a result errors may occur. When identified these errors have been corrected. While every attempt is made to correct errors during dictation discrepancies may still exist    Eliu Jeffers MD    2/2/2024    6:37 PM

## 2024-02-07 ENCOUNTER — TELEPHONE (OUTPATIENT)
Dept: OTOLARYNGOLOGY | Facility: CLINIC | Age: 44
End: 2024-02-07

## 2024-02-16 DIAGNOSIS — L72.0 EPIDERMAL CYST OF FACE: Primary | ICD-10-CM

## 2024-03-01 ENCOUNTER — OFFICE VISIT (OUTPATIENT)
Dept: FAMILY MEDICINE CLINIC | Facility: CLINIC | Age: 44
End: 2024-03-01
Payer: COMMERCIAL

## 2024-03-01 VITALS
RESPIRATION RATE: 14 BRPM | WEIGHT: 140 LBS | HEART RATE: 81 BPM | BODY MASS INDEX: 26.43 KG/M2 | SYSTOLIC BLOOD PRESSURE: 116 MMHG | OXYGEN SATURATION: 100 % | TEMPERATURE: 99 F | DIASTOLIC BLOOD PRESSURE: 72 MMHG | HEIGHT: 61 IN

## 2024-03-01 DIAGNOSIS — J06.9 VIRAL URI: ICD-10-CM

## 2024-03-01 DIAGNOSIS — B30.9 ACUTE VIRAL CONJUNCTIVITIS OF RIGHT EYE: Primary | ICD-10-CM

## 2024-03-01 NOTE — PROGRESS NOTES
CHIEF COMPLAINT:     Chief Complaint   Patient presents with    Pink Eye       HPI:   Milla Grayson is a 43 year old female who presents with chief complaint of \"pink eye\". Symptoms began  today.   Symptoms have been mild since onset.   Patient reports right eye redness, no discharge, no itching, no eyelid crusting.  Contact lens wearer: no.   Associated symptoms:  nasal congestion for 2-3 days.   Treatments tried: rohto eye drops every am upon awakening per rountine.   Denies fever, change in vision, sensitivity to light, pain with eye movement, foreign body sensation, eye injury, or contact with irritant.       Previous eye surgery: none      Current Outpatient Medications   Medication Sig Dispense Refill    Phentermine HCl 30 MG Oral Cap Take 1 capsule (30 mg total) by mouth every morning.      Multiple Vitamins-Minerals (MULTI-VITAMIN/MINERALS) Oral Tab Take 1 tablet by mouth daily.      Ascorbic Acid (VITAMIN C) 1000 MG Oral Tab Take 1 tablet (1,000 mg total) by mouth daily.        Past Medical History:   Diagnosis Date    Ovarian cyst 2005    ovarian cyst with 1st pregnancy - removed    Pure hypercholesterolemia 10/31/2022      Past Surgical History:   Procedure Laterality Date    APPENDECTOMY  1998      ,     CHOLECYSTECTOMY  2012    REMOVAL OF OVARIAN CYST(S)        Family History   Problem Relation Age of Onset    Hypertension Mother     Hyperlipidemia Mother     Hypertension Father     Hyperlipidemia Father     No Known Problems Sister     No Known Problems Sister     No Known Problems Maternal Grandmother     Cancer Maternal Grandfather         Stomach    Cancer Paternal Grandmother         Unspecified type    No Known Problems Paternal Grandfather     Colon Cancer Neg     Breast Cancer Neg     Ovarian Cancer Neg       Social History     Socioeconomic History    Marital status:    Tobacco Use    Smoking status: Never    Smokeless tobacco: Never   Substance and  Sexual Activity    Alcohol use: No    Drug use: No    Sexual activity: Yes     Partners: Male   Other Topics Concern    Breast feeding No    Reaction to local anesthetic No    Pt has a pacemaker No    Pt has a defibrillator No   Social History Narrative    Relationships:  - Norman*     Children: 2 boys - Norman (18M), Cameron (16M)    Pets: None    School: N/A    Work:  for early [employer]    Origin: From [place]. Came to [place] in [place].     Interests: Enjoys being at home, listening at home, watching movies.     Spiritual: Restorationism - important.              REVIEW OF SYSTEMS:   GENERAL: feels well otherwise  SKIN: no rashes  EYES:denies blurred vision or double vision. See HPI  HENT: denies ear pain, congestion, sore throat  LUNGS: denies shortness of breath or cough  CARDIOVASCULAR: denies chest pain or palpitations   NEURO: denies headaches     EXAM:   [facility] 05/01/2023     Visual Acuity     Vision Screen Test Type: Snellen Wall Chart    Right Eye Visual Acuity: Uncorrected Right Eye Chart Acuity: 20/30   Left Eye Visual Acuity: Uncorrected Left Eye Chart Acuity: 20/40   Both Eyes Visual Acuity: Uncorrected Both Eyes Chart Acuity: 20/30       Physical Exam  Constitutional:       General: She is not in acute distress.     Appearance: Normal appearance.   HENT:      Head: Normocephalic and atraumatic.      Right Ear: Tympanic membrane and ear canal normal.      Left Ear: Tympanic membrane and ear canal normal.      Nose: Rhinorrhea present. Rhinorrhea is clear.      Mouth/Throat:      Lips: Pink.      Mouth: Mucous membranes are moist.      Pharynx: Oropharynx is clear. Uvula midline. No posterior oropharyngeal erythema.      Tonsils: No tonsillar exudate.   Eyes:      General: Lids are normal.      Extraocular Movements: Extraocular movements intact.      Conjunctiva/sclera:      Right eye: Right conjunctiva is injected. No exudate.     Left eye: Left conjunctiva is not injected. No exudate.      Pupils: Pupils are equal, round, and reactive to light.   Cardiovascular:      Rate and Rhythm: Normal rate and regular rhythm.      Heart sounds: Normal heart sounds. No murmur heard.  Pulmonary:      Effort: Pulmonary effort is normal.      Breath sounds: Normal breath sounds and air entry.   Musculoskeletal:      Cervical back: Neck supple.   Lymphadenopathy:      Cervical: No cervical adenopathy.   Skin:     General: Skin is warm and dry.   Neurological:      General: No focal deficit present.      Mental Status: She is alert.             ASSESSMENT AND PLAN:   Milla Grayson is a 43 year old female who presents with:    ASSESSMENT:   Encounter Diagnoses   Name Primary?    Acute viral conjunctivitis of right eye Yes    Viral URI        PLAN:   Discussed likely viral etiology.  To use artificial tears multiple times throughout the day.  Monitor for worsening symptoms.  If bacterial like symptoms develop, to call wic and abx drops will be sent in.  Hygeine and comfort care as listed in patient instructions.    Medication as listed below     Requested Prescriptions      No prescriptions requested or ordered in this encounter     Risks, benefits, complications and side effects of meds discussed.    Can return to work/school after on medication for 24 hours.  Follow up with your PCP if you do not continue to improve with each day.  The parent/patient indicates understanding of these issues and agrees to the plan.    There are no Patient Instructions on file for this visit.

## 2024-03-13 ENCOUNTER — HOSPITAL ENCOUNTER (OUTPATIENT)
Dept: MAMMOGRAPHY | Facility: HOSPITAL | Age: 44
Discharge: HOME | End: 2024-03-13
Attending: FAMILY MEDICINE
Payer: COMMERCIAL

## 2024-03-13 DIAGNOSIS — Z00.00 HEALTH MAINTENANCE EXAMINATION: ICD-10-CM

## 2024-03-13 PROCEDURE — 77067 SCR MAMMO BI INCL CAD: CPT | Performed by: FAMILY MEDICINE

## 2024-03-13 PROCEDURE — 77063 BREAST TOMOSYNTHESIS BI: CPT | Performed by: FAMILY MEDICINE

## 2024-05-22 ENCOUNTER — LAB ENCOUNTER (OUTPATIENT)
Dept: LAB | Age: 44
End: 2024-05-22
Attending: FAMILY MEDICINE

## 2024-05-22 ENCOUNTER — OFFICE VISIT (OUTPATIENT)
Dept: INTERNAL MEDICINE CLINIC | Facility: CLINIC | Age: 44
End: 2024-05-22

## 2024-05-22 VITALS
DIASTOLIC BLOOD PRESSURE: 70 MMHG | SYSTOLIC BLOOD PRESSURE: 120 MMHG | OXYGEN SATURATION: 98 % | BODY MASS INDEX: 27.19 KG/M2 | HEIGHT: 61 IN | TEMPERATURE: 98 F | WEIGHT: 144 LBS | HEART RATE: 88 BPM

## 2024-05-22 DIAGNOSIS — L65.0 TELOGEN EFFLUVIUM: Primary | ICD-10-CM

## 2024-05-22 DIAGNOSIS — L65.0 TELOGEN EFFLUVIUM: ICD-10-CM

## 2024-05-22 DIAGNOSIS — L72.0 EPIDERMAL CYST: ICD-10-CM

## 2024-05-22 LAB
BASOPHILS # BLD AUTO: 0.04 X10(3) UL (ref 0–0.2)
BASOPHILS NFR BLD AUTO: 0.4 %
DEPRECATED HBV CORE AB SER IA-ACNC: 50.1 NG/ML
DEPRECATED RDW RBC AUTO: 48.1 FL (ref 35.1–46.3)
EOSINOPHIL # BLD AUTO: 0.08 X10(3) UL (ref 0–0.7)
EOSINOPHIL NFR BLD AUTO: 0.8 %
ERYTHROCYTE [DISTWIDTH] IN BLOOD BY AUTOMATED COUNT: 14.2 % (ref 11–15)
HCT VFR BLD AUTO: 39.2 %
HGB BLD-MCNC: 13.4 G/DL
IMM GRANULOCYTES # BLD AUTO: 0.03 X10(3) UL (ref 0–1)
IMM GRANULOCYTES NFR BLD: 0.3 %
IRON SATN MFR SERPL: 22 %
IRON SERPL-MCNC: 69 UG/DL
LYMPHOCYTES # BLD AUTO: 3.23 X10(3) UL (ref 1–4)
LYMPHOCYTES NFR BLD AUTO: 31 %
MCH RBC QN AUTO: 31.6 PG (ref 26–34)
MCHC RBC AUTO-ENTMCNC: 34.2 G/DL (ref 31–37)
MCV RBC AUTO: 92.5 FL
MONOCYTES # BLD AUTO: 0.89 X10(3) UL (ref 0.1–1)
MONOCYTES NFR BLD AUTO: 8.5 %
NEUTROPHILS # BLD AUTO: 6.16 X10 (3) UL (ref 1.5–7.7)
NEUTROPHILS # BLD AUTO: 6.16 X10(3) UL (ref 1.5–7.7)
NEUTROPHILS NFR BLD AUTO: 59 %
PLATELET # BLD AUTO: 255 10(3)UL (ref 150–450)
RBC # BLD AUTO: 4.24 X10(6)UL
TIBC SERPL-MCNC: 314 UG/DL (ref 250–425)
TRANSFERRIN SERPL-MCNC: 211 MG/DL (ref 250–380)
TSI SER-ACNC: 2.16 MIU/ML (ref 0.55–4.78)
VIT D+METAB SERPL-MCNC: 28.8 NG/ML (ref 30–100)
WBC # BLD AUTO: 10.4 X10(3) UL (ref 4–11)

## 2024-05-22 PROCEDURE — 84443 ASSAY THYROID STIM HORMONE: CPT

## 2024-05-22 PROCEDURE — 82728 ASSAY OF FERRITIN: CPT

## 2024-05-22 PROCEDURE — 36415 COLL VENOUS BLD VENIPUNCTURE: CPT

## 2024-05-22 PROCEDURE — 82306 VITAMIN D 25 HYDROXY: CPT

## 2024-05-22 PROCEDURE — 83540 ASSAY OF IRON: CPT

## 2024-05-22 PROCEDURE — 84466 ASSAY OF TRANSFERRIN: CPT

## 2024-05-22 PROCEDURE — 85025 COMPLETE CBC W/AUTO DIFF WBC: CPT

## 2024-05-22 NOTE — PATIENT INSTRUCTIONS
PATIENT INSTRUCTIONS    Thank you for seeing me today, it was a pleasure taking care of you.  Please check out at the  and schedule a follow up appointment.  Return in about 8 months (around 1/22/2025) for physical .  Please remember that the luis alfredo period for all appointments is 5 minutes. This is to help maximize the amount of time that we can spend together at our visits.    Monitor stress  Check labs   Can follow up with dermatology to discuss treatment options     Best,  Dr. Quezada

## 2024-05-22 NOTE — ASSESSMENT & PLAN NOTE
Patient with an epidermal cyst on the right chin.  Does not desire removal, prefers to monitor for now

## 2024-05-22 NOTE — PROGRESS NOTES
FAMILY MEDICINE CLINIC NOTE    HPI  Milla Grayson is a 44 year old female presenting for       #Hair loss  -minoxidil 5% -for the past 3-4 years, stopped in January  -now with persistent hair loss   -no bald patches   -does have some stress in life       #Epidermal cyst  -papule on right chin  -saw dermatology in the past - was told a cyst  -has grown again   -dermatology Dr Mcdonald  -referred to ENT Dr Jeffers - pt does not desire removal, does not desire to deal with scar     ---other     #R chest wall pain  11/2022  -1 week  -reports soon getting period  -R breast  -no lumps  -screening mammogram 11/2022 was normal     5/2023  -intermittent, still coming and going  -note related to cycle  -no pain when sitting  -but when pressure is applied - lying, pressing  -right breast, sometimes left - but mild  -mammo 11/2022 - CONCLUSION:   No mammographic evidence of malignancy.      1/2024  -intermittent discomfort  -no lumps or bumps in breast  -notes discomfort with sleeping        #Elevated liver enzymes  -CMP 10/2022 ALT 62      #Overweight  -reports getting phentermine 30 mg daily  -started 10 years ago     #Visual change  -reports vision has changed over the years  -now has to wear some reading glasses  -also questions about hyperpigmentation seen on right eye  -referred to opthalmology    ROS  GENERAL: No fever/chills, no recent weight loss  HEENT: No visual changes, no changes in hearing, no sore throats  NECK: No pain, no swelling  RESP: No cough, no SOB  CV: No chest pain, no palpitations  GI: No abd pain, no N/V/D  MSK: No edema  SKIN: No new rashes  NEURO: No numbness, no tingling, no headaches    HEALTH MAINTENANCE  Health Maintenance Topics with due status: Overdue       Topic Date Due    COVID-19 Vaccine 09/01/2023    DTaP,Tdap,and Td Vaccines 05/14/2024       ALLERGIES  No Known Allergies    MEDICATIONS  Current Outpatient Medications   Medication Sig Dispense Refill    Phentermine HCl 30 MG Oral Cap Take 1  capsule (30 mg total) by mouth every morning.      Multiple Vitamins-Minerals (MULTI-VITAMIN/MINERALS) Oral Tab Take 1 tablet by mouth daily.      Ascorbic Acid (VITAMIN C) 1000 MG Oral Tab Take 1 tablet (1,000 mg total) by mouth daily.         ACTIVE PROBLEMS  Patient Active Problem List   Diagnosis    Pure hypercholesterolemia    Health maintenance examination    Epidermal cyst    Overweight (BMI 25.0-29.9)    Elevated liver enzymes    Right-sided chest wall pain    Visual changes    Telogen effluvium       PAST MEDICAL HISTORY  Past Medical History:    Ovarian cyst    ovarian cyst with 1st pregnancy - removed    Pure hypercholesterolemia       PAST SOCIAL HISTORY  Social History     Socioeconomic History    Marital status:      Spouse name: Not on file    Number of children: Not on file    Years of education: Not on file    Highest education level: Not on file   Occupational History    Not on file   Tobacco Use    Smoking status: Never    Smokeless tobacco: Never   Vaping Use    Vaping status: Not on file   Substance and Sexual Activity    Alcohol use: No    Drug use: No    Sexual activity: Yes     Partners: Male   Other Topics Concern    Grew up on a farm Not Asked    History of tanning Not Asked    Outdoor occupation Not Asked    Breast feeding No    Reaction to local anesthetic No    Pt has a pacemaker No    Pt has a defibrillator No   Social History Narrative    Relationships:  - Norman*     Children: 2 boys - Norman (18M), Cameron (16M)    Pets: None    School: N/A    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 Came to [place] in 1989.     Interests: Enjoys being at home, listening at home, watching movies.     Spiritual: Episcopal - important.          Social Determinants of Health     Financial Resource Strain: Not on file   Food Insecurity: Not on file   Transportation Needs: Not on file   Physical Activity: Not on file   Stress: Not on file   Social Connections: Not on  file   Housing Stability: At Risk (2023)    Received from [facility]     Living Situation: Not on file     Housing Problems: Not on file       PAST SURGICAL HISTORY  Past Surgical History:   Procedure Laterality Date    Appendectomy  1998      , 2007    Cholecystectomy  2012    Removal of ovarian cyst(s)         PAST FAMILY HISTORY  Family History   Problem Relation Age of Onset    Hypertension Mother     Hyperlipidemia Mother     Hypertension Father     Hyperlipidemia Father     No Known Problems Sister     No Known Problems Sister     No Known Problems Maternal Grandmother     Cancer Maternal Grandfather         Stomach    Cancer Paternal Grandmother         Unspecified type    No Known Problems Paternal Grandfather     Colon Cancer Neg     Breast Cancer Neg     Ovarian Cancer Neg          PHYSICAL EXAM  Vitals:    24 1455   BP: 120/70   Pulse: 88   Temp: 98.1 °F (36.7 °C)   SpO2: 98%   Weight: 144 lb (65.3 kg)   Height: 5' 1\" (1.549 m)      Body mass index is 27.21 kg/m².    GENERAL: NAD  RESP: Non-labored respirations  SKIN: Warm and dry, there is a blue subcutaneous cystic nodule.  On the temporal aspect of the scalp there are very short small strands of hair that is returning.  NEURO: Answering questions appropriately    LABS  Lab Results   Component Value Date    WBC 10.3 01/10/2024    HGB 13.5 01/10/2024    HCT 40.6 01/10/2024    .0 01/10/2024    NEPERCENT 61.4 01/10/2024    LYPERCENT 29.6 01/10/2024    MOPERCENT 7.9 01/10/2024    EOPERCENT 0.4 01/10/2024    BAPERCENT 0.4 01/10/2024    NE 6.32 01/10/2024    LYMABS 3.05 01/10/2024    MOABSO 0.81 01/10/2024    EOABSO 0.04 01/10/2024    BAABSO 0.04 01/10/2024       Lab Results   Component Value Date     01/10/2024    K 3.9 01/10/2024     01/10/2024    CO2 26.0 01/10/2024    ANIONGAP 7 01/10/2024    BUN 12 01/10/2024    CREATSERUM 0.75 01/10/2024    BUNCREA 16.0 01/10/2024    GLU 85  01/10/2024    CA 9.1 01/10/2024    OSMOCALC 285 01/10/2024    ALT 39 01/10/2024    AST 30 01/10/2024    ALKPHO 80 01/10/2024    BILT 0.8 01/10/2024    TP 7.7 01/10/2024    ALB 4.3 01/10/2024    GLOBULIN 3.4 01/10/2024    ELECTAG 1.3 01/10/2024    FASTING Yes 01/10/2024    FASTING Yes 01/10/2024         Lab Results   Component Value Date    CHOLEST 237 (H) 01/10/2024    TRIG 93 01/10/2024    HDL 78 (H) 01/10/2024     (H) 01/10/2024    VLDL 17 01/10/2024    NONHDLC 159 (H) 01/10/2024        DIAGNOSTICS      ASSESSMENT/PLAN  Problem List Items Addressed This Visit          Skin    Epidermal cyst     Patient with an epidermal cyst on the right chin.  Does not desire removal, prefers to monitor for now         Telogen effluvium - Primary     Patient with history of hair loss.  Recently this has been worse  She was on minoxidil but stopped this in January  Examination seems consistent with telogen effluvium.  She does note that she has some stress in life  Can also consider mild androgenic alopecia.  Advised that she can return back to the minoxidil, however she prefers not to  Labs including CBC, iron, ferritin, TSH, vitamin D.  Can see dermatology for further evaluation and management.           Relevant Orders    CBC With Differential With Platelet    TSH W Reflex To Free T4    Iron And Tibc [E]    Vitamin D [E]    Ferritin [E]    DERM - INTERNAL       Return in about 8 months (around 2025) for physical .    This is a Header Test   Topic Date Due    Annual Physical  01/10/2025        Tawanda Quezada MD  Family Medicine           Pre-chartin minutes  Reviewing/obtainin minutes  Medical Exam:3 minutes  Counseling/education: 5 minutes  Notes: 3 minutes  Referring/communicatin minutes  Care coordination: 0 minutes    My total time spent caring for the patient on the day of the encounter: 18 minutes

## 2024-05-22 NOTE — ASSESSMENT & PLAN NOTE
Patient with history of hair loss.  Recently this has been worse  She was on minoxidil but stopped this in January  Examination seems consistent with telogen effluvium.  She does note that she has some stress in life  Can also consider mild androgenic alopecia.  Advised that she can return back to the minoxidil, however she prefers not to  Labs including CBC, iron, ferritin, TSH, vitamin D.  Can see dermatology for further evaluation and management.

## 2024-06-03 ENCOUNTER — LAB ENCOUNTER (OUTPATIENT)
Dept: LAB | Age: 44
End: 2024-06-03
Attending: PHYSICIAN ASSISTANT
Payer: COMMERCIAL

## 2024-06-03 ENCOUNTER — OFFICE VISIT (OUTPATIENT)
Dept: DERMATOLOGY CLINIC | Facility: CLINIC | Age: 44
End: 2024-06-03
Payer: COMMERCIAL

## 2024-06-03 DIAGNOSIS — L65.9 HAIR LOSS: Primary | ICD-10-CM

## 2024-06-03 DIAGNOSIS — L65.9 HAIR LOSS: ICD-10-CM

## 2024-06-03 PROCEDURE — 36415 COLL VENOUS BLD VENIPUNCTURE: CPT

## 2024-06-03 PROCEDURE — 86038 ANTINUCLEAR ANTIBODIES: CPT

## 2024-06-03 NOTE — PROGRESS NOTES
HPI:    Patient ID: Milla Grayson is a 44 year old female.    Patient presents for hair loss for the past 3 months. Was using minoxidil for the past 3 years. Feels that this may have been caused by minoxidil stopping. No itching. No draining or tenderness noted. No scaling noted. No allergies to medications noted. No recent surgeries. No recent illnesses noted. Admits to being stressed. She does have protein in her diet. She is taking 1000 units of Vitamin D daily. Iron was normal as well as TSH.         Review of Systems   Constitutional:  Negative for chills and fever.   Musculoskeletal:  Negative for arthralgias and myalgias.   Skin:  Positive for rash. Negative for color change and wound.            Current Outpatient Medications   Medication Sig Dispense Refill    Phentermine HCl 30 MG Oral Cap Take 1 capsule (30 mg total) by mouth every morning.      Multiple Vitamins-Minerals (MULTI-VITAMIN/MINERALS) Oral Tab Take 1 tablet by mouth daily.      Ascorbic Acid (VITAMIN C) 1000 MG Oral Tab Take 1 tablet (1,000 mg total) by mouth daily.       Allergies:No Known Allergies   LMP 02/25/2024 (Exact Date)   There is no height or weight on file to calculate BMI.  PHYSICAL EXAM:   Physical Exam  Constitutional:       General: She is not in acute distress.     Appearance: Normal appearance.   Skin:     General: Skin is warm and dry.      Findings: Rash present.      Comments: Follicles are present. Little hairs are noted to be growing back. No draining or tenderness noted. No scaling noted. No erythema noted.    Neurological:      Mental Status: She is alert and oriented to person, place, and time.                ASSESSMENT/PLAN:   1. Hair loss  -After discussion with patient, advised the following:  -Check labs  -Will call with results.   -Will go over with dermatologist options we can give her  -Not willing to try topicals again.   -To call or follow-up with worsening symptoms or concerns.   -Pt was agreeable to plan and  will comply with discussion above.     - Anti-Nuclear Antibody (CORNELIO) by IFA Screen, Reflex Titer; Future      Orders Placed This Encounter   Procedures    Anti-Nuclear Antibody (CORNELIO) by IFA Screen, Reflex Titer       Meds This Visit:  Requested Prescriptions      No prescriptions requested or ordered in this encounter       Imaging & Referrals:  None         ID#[id number]

## 2024-06-04 LAB — NUCLEAR IGG TITR SER IF: NEGATIVE {TITER}

## 2024-09-18 ENCOUNTER — HOSPITAL ENCOUNTER (OUTPATIENT)
Age: 44
Discharge: HOME | End: 2024-09-18
Payer: COMMERCIAL

## 2024-09-18 ENCOUNTER — APPOINTMENT (OUTPATIENT)
Dept: CT IMAGING | Age: 44
End: 2024-09-18
Attending: NURSE PRACTITIONER
Payer: COMMERCIAL

## 2024-09-18 ENCOUNTER — OFFICE VISIT (OUTPATIENT)
Dept: FAMILY MEDICINE CLINIC | Facility: CLINIC | Age: 44
End: 2024-09-18
Payer: COMMERCIAL

## 2024-09-18 VITALS
HEART RATE: 80 BPM | RESPIRATION RATE: 20 BRPM | TEMPERATURE: 98 F | SYSTOLIC BLOOD PRESSURE: 116 MMHG | DIASTOLIC BLOOD PRESSURE: 72 MMHG | OXYGEN SATURATION: 100 %

## 2024-09-18 VITALS
WEIGHT: 147 LBS | HEIGHT: 61 IN | BODY MASS INDEX: 27.75 KG/M2 | RESPIRATION RATE: 16 BRPM | HEART RATE: 71 BPM | OXYGEN SATURATION: 100 % | SYSTOLIC BLOOD PRESSURE: 110 MMHG | TEMPERATURE: 98 F | DIASTOLIC BLOOD PRESSURE: 84 MMHG

## 2024-09-18 DIAGNOSIS — R51.9 ACUTE NONINTRACTABLE HEADACHE, UNSPECIFIED HEADACHE TYPE: Primary | ICD-10-CM

## 2024-09-18 DIAGNOSIS — J34.89 SINUS PRESSURE: ICD-10-CM

## 2024-09-18 LAB — B-HCG UR QL: NEGATIVE

## 2024-09-18 PROCEDURE — 81025 URINE PREGNANCY TEST: CPT

## 2024-09-18 PROCEDURE — 99204 OFFICE O/P NEW MOD 45 MIN: CPT

## 2024-09-18 PROCEDURE — 70450 CT HEAD/BRAIN W/O DYE: CPT | Performed by: NURSE PRACTITIONER

## 2024-09-18 PROCEDURE — 96375 TX/PRO/DX INJ NEW DRUG ADDON: CPT

## 2024-09-18 PROCEDURE — 96374 THER/PROPH/DIAG INJ IV PUSH: CPT

## 2024-09-18 PROCEDURE — 3079F DIAST BP 80-89 MM HG: CPT | Performed by: NURSE PRACTITIONER

## 2024-09-18 PROCEDURE — 99214 OFFICE O/P EST MOD 30 MIN: CPT

## 2024-09-18 PROCEDURE — 3074F SYST BP LT 130 MM HG: CPT | Performed by: NURSE PRACTITIONER

## 2024-09-18 PROCEDURE — 3008F BODY MASS INDEX DOCD: CPT | Performed by: NURSE PRACTITIONER

## 2024-09-18 RX ORDER — METOCLOPRAMIDE HYDROCHLORIDE 5 MG/ML
10 INJECTION INTRAMUSCULAR; INTRAVENOUS ONCE
Status: COMPLETED | OUTPATIENT
Start: 2024-09-18 | End: 2024-09-18

## 2024-09-18 RX ORDER — SODIUM CHLORIDE 9 MG/ML
1000 INJECTION, SOLUTION INTRAVENOUS ONCE
Status: COMPLETED | OUTPATIENT
Start: 2024-09-18 | End: 2024-09-18

## 2024-09-18 RX ORDER — KETOROLAC TROMETHAMINE 30 MG/ML
30 INJECTION, SOLUTION INTRAMUSCULAR; INTRAVENOUS ONCE
Status: COMPLETED | OUTPATIENT
Start: 2024-09-18 | End: 2024-09-18

## 2024-09-18 NOTE — PROGRESS NOTES
CHIEF COMPLAINT:     Chief Complaint   Patient presents with    Headache     Sx 1 week - ST, runny nose, cough  Sx Friday - Occipital headache (9/10), fatigue, loss of appetite  Sx Saturday - Felt a little better  Sx Sunday - Occipital headache (9/10), fatigue  Sx yesterday - Felt pressure behind R eye   Sx today - L sided parietal headache  Denies dizziness, vision changes, SOB, chest pain  OTC Tylenol and Aleve         HPI:     Milla Grayson is a 44 year old female presents with complaints of headaches. Has had this series of HA's for 6 days intermittently. Pt reported several days before the onset of the headache had congestion, slight cough, sneezing, and rhinorrhea. No green or nasal discharge. Pt reported tactile fever yesterday but did not take temperature. Description of pain: pressure. Pt reported yesterday was pressure in back of right eye and now some pressure in occipital area. Duration of individual headaches: 10-15hrs . Associated symptoms: nausea and photophobia and loud noises. Pain relief: OTC NSAID's. Precipitating factors: stress. He denies a history of recent head injury.    Reports pain currently at 8.5/10    Tx: Tylenol every 6 hrs - did not help much. Aleeve helped more at night.       No vision changes. Pt reports some nausea and loss of appetite but no emesis. Full ROM of neck.     Current Outpatient Medications   Medication Sig Dispense Refill    Phentermine HCl 30 MG Oral Cap Take 1 capsule (30 mg total) by mouth every morning.      Multiple Vitamins-Minerals (MULTI-VITAMIN/MINERALS) Oral Tab Take 1 tablet by mouth daily.      Ascorbic Acid (VITAMIN C) 1000 MG Oral Tab Take 1 tablet (1,000 mg total) by mouth daily.        Past Medical History:    Ovarian cyst    ovarian cyst with 1st pregnancy - removed    Pure hypercholesterolemia      Social History:  Social History     Socioeconomic History    Marital status:    Tobacco Use    Smoking status: Never    Smokeless tobacco: Never    Substance and Sexual Activity    Alcohol use: No    Drug use: No    Sexual activity: Yes     Partners: Male   Other Topics Concern    Breast feeding No    Reaction to local anesthetic No    Pt has a pacemaker No    Pt has a defibrillator No   Social History Narrative    Relationships:  - Norman*     Children: 2 boys - Norman (18M), Cameron (16M)    Pets: None    School: N/A    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 Came to [place] in [place].     Interests: Enjoys being at home, listening at home, watching movies.     Spiritual: Buddhism - important.          Social Determinants of Health      [facility] [email or web address]        REVIEW OF SYSTEMS:   GENERAL:  Denies fever, chills,weight change, decreased appetite.  SKIN: Denies rashes, skin wounds or ulcers.  EYES: Denies blurred vision or double vision  HENT: Denies facial weakness,or ear pain.  Denies diminished hearing  CHEST: Denies chest pain, or palpitations  LUNGS: Denies shortness of breath, cough, or wheezing  GI: Denies abdominal pain, V/C/D.   MUSCULOSKELETAL: no arthralgia or swollen joints  LYMPH:  Denies lymphadenopathy  NEURO:  Headaches as described above.  Denies lightheadedness.  No seizures, no confusion, no weakness, no abnormal sensation, no vertigo.    EXAM:   /84   Pulse 71   Temp 98.1 °F (36.7 °C)   Resp 16   Ht 5' 1\" (1.549 m)   Wt 147 lb (66.7 kg)   LMP 09/12/2024 (Exact Date)   SpO2 100%   BMI 27.78 kg/m²   GENERAL: well developed, well nourished,in no apparent distress  SKIN: no rashes or suspicious lesions  EYES: Pupils round, reactive to light and accomodation.  EOMI.   HENT: atraumatic, normocephalic, ears and throat are clear  NECK: supple, no bruits  LUNGS: clear to auscultation bilaterally, breathing is non labored  CARDIO: RRR without murmur  NEURO: Alert & Oriented x 3.  CN II-XII intact. Moving all 4 extremities without difficulty.  Romberg negative.  No  facial droop.  No slurred speech.  Patella & Brachioradialis DTR intact bilaterally.      EXTREMITIES: no cyanosis, clubbing or edema  LYMPH: No cervical or supraclavicular lymphadenopathy.   PSYCH:  Speech, mood and affect are appropriate.     ASSESSMENT AND PLAN:   ASSESSMENT:     1. Acute nonintractable headache, unspecified headache type      2. Sinus pressure    PLAN:   Accompanied by: self  After triage and detailed discussion with patient/familiy, it was determined that they would benefit from a higher acuity or more comprehensive level of care.  I informed them of the scope of practice of the [facility] and advised the be further evaluated at [facility] due to concern of ongoing headache and nausea.   Reported to: Dr. Stearns, she is wiling to accept patient.   Patient/parent /family verbalized understanding of rationale for further evaluation and was stable upon departure from the [facility] Clinic.   Patient Instructions   Go to immediate care today and without delay    The patient indicates understanding of these issues and agrees to the plan.

## 2024-09-18 NOTE — ED PROVIDER NOTES
Patient Seen in: [facility]      History     Chief Complaint   Patient presents with    Headache     Stated Complaint: sent from [place]- headaches    Subjective:   HPI    This is a 44-year-old female with history of inverted papilloma, ovarian cyst, hypercholesteremia presenting with a headache.  Patient states that she has had sore throat runny nose and cough for about a week Friday developed a posterior and frontal headache.  Patient states she felt a little bit better on Saturday but then the headache resumed on  and felt pressure behind both of her eyes.  Denies any dizziness vision changes chest pain shortness of breath nausea or vomiting weakness or numbness/tingling to extremities.  Patient states in  she was having a lot of headaches while she was pregnant and had a scan done of her brain and show she had inverted papilloma so she is not sure if that is the reason why she is having the headache now.  Denies any head injury or trauma.  Patient states she has been taking Tylenol Aleve last had Aleve at around 8:00 AM this morning.  Denies sudden onset headache or the worst headache of her life.    Objective:   Past Medical History:    Ovarian cyst    ovarian cyst with 1st pregnancy - removed    Pure hypercholesterolemia              Past Surgical History:   Procedure Laterality Date    Appendectomy  1998      , 2007    Cholecystectomy  2012    Removal of ovarian cyst(s)                  No pertinent social history.            Review of Systems    Positive for stated Chief Complaint: Headache    Other systems are as noted in HPI.  Constitutional and vital signs reviewed.      All other systems reviewed and negative except as noted above.    Physical Exam     ED Triage Vitals [24 1547]   /72   Pulse 80   Resp 20   Temp 97.8 °F (36.6 °C)   Temp src Temporal   SpO2 100 %   O2 Device None (Room air)       Current Vitals:   Vital Signs  BP:  116/72  Pulse: 80  Resp: 20  Temp: 97.8 °F (36.6 °C)  Temp src: Temporal    Oxygen Therapy  SpO2: 100 %  O2 Device: None (Room air)            Physical Exam  Vitals and nursing note reviewed.   Constitutional:       Appearance: Normal appearance.   HENT:      Head: Atraumatic.      Right Ear: Tympanic membrane normal.      Left Ear: Tympanic membrane normal.      Nose: Nose normal. No congestion or rhinorrhea.      Mouth/Throat:      Mouth: Mucous membranes are moist.      Pharynx: Oropharynx is clear. No posterior oropharyngeal erythema.   Eyes:      Extraocular Movements: Extraocular movements intact.      Conjunctiva/sclera: Conjunctivae normal.      Pupils: Pupils are equal, round, and reactive to light.      Comments: No nystagmus   Cardiovascular:      Rate and Rhythm: Normal rate.   Pulmonary:      Effort: Pulmonary effort is normal. No respiratory distress.      Breath sounds: Normal breath sounds. No wheezing.   Musculoskeletal:         General: Normal range of motion.      Cervical back: Normal range of motion.   Skin:     General: Skin is warm and dry.      Capillary Refill: Capillary refill takes less than 2 seconds.   Neurological:      General: No focal deficit present.      Mental Status: She is alert and oriented to person, place, and time.      Cranial Nerves: No cranial nerve deficit.      Motor: No weakness.      Gait: Gait normal.   Psychiatric:         Mood and Affect: Mood normal.               ED Course     Labs Reviewed   POCT PREGNANCY URINE - Normal       Narrative   PROCEDURE: CT BRAIN OR HEAD (CPT=70450)     COMPARISON: None.     INDICATIONS: Headache     TECHNIQUE: CT images were obtained without contrast material.  Automated exposure control for dose reduction was used.  Dose information is transmitted to the ACR (American College of Radiology) NRDR (National Radiology Data Registry) which includes the  Dose Index Registry.     Findings and impression:     Normal skull     No hemorrhage  or mass effect or edema     Normal midline ventricles     Finalized by (CST): Ken Hinds MD on 9/18/2024 at 4:36 PM                 MDM                        Medical Decision Making  44-year-old female nontoxic-appearing presenting with headache.  DDx tension headache versus migraine headache versus sinusitis versus sinus headache versus occipital CVA versus subarachnoid hemorrhage.  Low suspicion for occipital subarachnoid hemorrhage or CVA as her physical exam is essentially unremarkable suspect possible migraine headache as it further talking with the patient she does have some photosensitivity and has felt some nausea.  Discussed and offered CT of the brain given the history of inverted papilloma a UCG will be obtained she will receive IV fluids and Reglan and Toradol once the CT is resulted to help with symptoms if patient is feeling better will discharge home to closely follow-up with PCP with strict ER precautions.  Patient is agreeable with this plan of care.    CT has resulted above patient made aware of results UCG negative patient made aware of results    Reevaluation after receiving IV fluids Reglan and Toradol patient states she is feeling significantly better the headache is essentially resolved.  Discussed possible causes of the headache discussed outpatient follow-up with her PCP provided resources for neurology to follow-up with if she continues to have headaches discussed strict ER precautions with any new or worsening symptoms.  Patient feels comfortable with the plan of care and acknowledges understanding discharge instructions.    Problems Addressed:  Acute nonintractable headache, unspecified headache type: acute illness or injury    Amount and/or Complexity of Data Reviewed  Labs: ordered. Decision-making details documented in ED Course.  Radiology: ordered. Decision-making details documented in ED Course.  Discussion of management or test interpretation with external provider(s): This  patient was discussed with my attending Dr. Stearns who agrees with this provider's management and plan of care.    Risk  OTC drugs.        Disposition and Plan     Clinical Impression:  1. Acute nonintractable headache, unspecified headache type         Disposition:  Discharge  9/18/2024  5:08 pm    Follow-up:  Tawanda Quezada MD  [address] [facility] [id number] [phone]    Schedule an appointment as soon as possible for a visit in 3 days      Avila Hernandez MD  [address] [id number] [phone]      Resource for neurologist to follow-up with if symptoms persist or worsen          Medications Prescribed:  Discharge Medication List as of 9/18/2024  5:08 PM

## 2024-09-18 NOTE — DISCHARGE INSTRUCTIONS
Continue over-the-counter ibuprofen or Tylenol for pain or discomfort.  Drink plenty of fluids eat regularly decrease your screen time looking at your phone or watching TV you may lay and rest in a dark room with eyes closed.  Follow-up with your primary care provider 3 to 4 days.  Follow-up with a neurologist if you continue to have headaches if you develop sudden onset headache worst headache of your life vision changes nausea or vomiting chest pain shortness of breath weakness or any new or worsening symptoms go to the nearest emergency department.

## 2024-09-30 ENCOUNTER — OFFICE VISIT (OUTPATIENT)
Dept: INTERNAL MEDICINE CLINIC | Facility: CLINIC | Age: 44
End: 2024-09-30
Payer: COMMERCIAL

## 2024-09-30 ENCOUNTER — HOSPITAL ENCOUNTER (OUTPATIENT)
Dept: GENERAL RADIOLOGY | Age: 44
Discharge: HOME | End: 2024-09-30
Attending: FAMILY MEDICINE
Payer: COMMERCIAL

## 2024-09-30 VITALS
OXYGEN SATURATION: 98 % | BODY MASS INDEX: 28.13 KG/M2 | WEIGHT: 149 LBS | HEIGHT: 61 IN | DIASTOLIC BLOOD PRESSURE: 70 MMHG | SYSTOLIC BLOOD PRESSURE: 114 MMHG | HEART RATE: 84 BPM | TEMPERATURE: 97 F

## 2024-09-30 DIAGNOSIS — Z00.00 HEALTH MAINTENANCE EXAMINATION: ICD-10-CM

## 2024-09-30 DIAGNOSIS — M79.672 LEFT FOOT PAIN: Primary | ICD-10-CM

## 2024-09-30 DIAGNOSIS — L65.0 TELOGEN EFFLUVIUM: ICD-10-CM

## 2024-09-30 DIAGNOSIS — M79.672 LEFT FOOT PAIN: ICD-10-CM

## 2024-09-30 PROCEDURE — 73620 X-RAY EXAM OF FOOT: CPT | Performed by: FAMILY MEDICINE
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
## 2024-09-30 NOTE — ASSESSMENT & PLAN NOTE
No significant abnormalities seen by her neck.  Advised to monitor.  Has had on and off viral syndromes recently-may have contributed to neck discomfort.  Due for Tdap today.

## 2024-09-30 NOTE — PATIENT INSTRUCTIONS
PATIENT INSTRUCTIONS    Thank you for seeing me today, it was a pleasure taking care of you.  Please check out at the  and schedule a follow up appointment.  Return if symptoms worsen or fail to improve.  Please remember that the luis alfredo period for all appointments is 5 minutes. This is to help maximize the amount of time that we can spend together at our visits.    Can see podiatry for foot pain  X-ray of foot  Wear shoes with good foot support  Tylenol as needed    Best,  Dr. Quezada    [facility] LABS AND IMAGING INFORMATION    Here are some lab and imaging locations available to you. Please note that some of the times and availabilities are subject to change. Please refer to the  [employer] webpage for the most recent updates. There are also additional locations that can be found on the  [employer] webpage.    To schedule a lab appointment, please call [phone].    To schedule an imaging appointment, please call [phone], option 2      [address] [address]    Lab Hours  Monday - Friday 7:30am - 5pm  Saturday 7:30am - 4pm    X-ray Hours  Monday - Friday 8am - 8pm  Sat, Sun & holidays 8am - 4:30pm      [facility]  [address] [place] [address]    Lab Hours  Monday - Friday 6:30am - 8pm  Saturday 6:30am - 3pm  Sunday 7:30am - 4pm    X-ray Hours  Monday - Friday 7am - 8pm  Saturday 7am - 3:30pm      [facility]  [address] [place] [address]    Lab Hours  Monday - Friday 7:30am - 5pm    X-ray Hours  None at this location      [facility] [address] [address]    Lab Hours  Lab services temporarily unavailable    X-ray Hours  Monday - Friday 8am - 4:30pm      [place] [facility] [place]  [address] [place] [address]    Lab Hours  Monday -  Friday 7:30am - 5pm  Saturday 7:30am - 4pm    X-ray Hours  Monday - Friday 8am - 8pm  Sat, Sun & holidays 8am - 4pm      [facility] [place]  [address]  [place] [address]    Lab Hours  Monday - Friday 7:30am - 4pm    X-ray Hours  Monday - Friday 8am - 4:30pm      [facility] [place] [facility] [place]  [address]  [place] [address]    Lab Hours  Monday - Friday 8am - 4:30pm    X-ray Hours  None at this location

## 2024-09-30 NOTE — ASSESSMENT & PLAN NOTE
Patient with persistent discomfort near the left distal fifth metatarsal.  Unclear if tendinitis pain as she does have pain with flexion and extension of her left fifth toe.  Check an x-ray of her foot.  Can also consider tailor's bunion.    Can take Tylenol or Advil as needed for pain  Will refer to podiatry to further evaluate.
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
## 2025-01-02 ENCOUNTER — OFFICE VISIT (OUTPATIENT)
Dept: INTERNAL MEDICINE CLINIC | Facility: CLINIC | Age: 45
End: 2025-01-02
Payer: COMMERCIAL

## 2025-01-02 VITALS
OXYGEN SATURATION: 99 % | WEIGHT: 147 LBS | DIASTOLIC BLOOD PRESSURE: 72 MMHG | TEMPERATURE: 98 F | HEIGHT: 61 IN | HEART RATE: 68 BPM | BODY MASS INDEX: 27.75 KG/M2 | SYSTOLIC BLOOD PRESSURE: 118 MMHG

## 2025-01-02 DIAGNOSIS — M79.672 LEFT FOOT PAIN: Primary | ICD-10-CM

## 2025-01-02 DIAGNOSIS — Z23 INFLUENZA VACCINE NEEDED: ICD-10-CM

## 2025-01-02 DIAGNOSIS — Z00.00 HEALTH MAINTENANCE EXAMINATION: ICD-10-CM

## 2025-01-02 NOTE — ASSESSMENT & PLAN NOTE
Patient with persistent discomfort near the left distal fifth metatarsal  Suspect tendonitis as she does have pain with flexion and extension of her left fifth toe.  Check an x-ray of her foot.  Can also consider tailor's bunion.    Can take Tylenol or Advil as needed for pain  Try physical therapy  If without improvement see podiatry

## 2025-01-02 NOTE — PATIENT INSTRUCTIONS
PATIENT INSTRUCTIONS    Thank you for seeing me today, it was a pleasure taking care of you.  Please check out at the  and schedule a follow up appointment.  Return in about 6 months (around 7/2/2025) for physical .  Please remember that the preferred luis alfredo period for appointments is 5 minutes. This is to help maximize the amount of time that we can spend together at our visits.    Mammogram - March  Please call [phone] to schedule your imaging appointment.   Schedule an appointment to see GI - after 45  Physical therapy for foot  If without improvement in pain, see podiatry    Dr. Damien Hull

## 2025-01-28 ENCOUNTER — TELEPHONE (OUTPATIENT)
Dept: PHYSICAL THERAPY | Facility: HOSPITAL | Age: 45
End: 2025-01-28

## 2025-02-04 ENCOUNTER — TELEPHONE (OUTPATIENT)
Dept: PHYSICAL THERAPY | Facility: HOSPITAL | Age: 45
End: 2025-02-04

## 2025-02-05 ENCOUNTER — OFFICE VISIT (OUTPATIENT)
Dept: PHYSICAL THERAPY | Facility: HOSPITAL | Age: 45
End: 2025-02-05
Attending: FAMILY MEDICINE
Payer: COMMERCIAL

## 2025-02-05 DIAGNOSIS — M79.672 LEFT FOOT PAIN: Primary | ICD-10-CM

## 2025-02-05 PROCEDURE — 97161 PT EVAL LOW COMPLEX 20 MIN: CPT

## 2025-02-05 PROCEDURE — 97110 THERAPEUTIC EXERCISES: CPT

## 2025-02-05 NOTE — PROGRESS NOTES
LOWER EXTREMITY EVALUATION:     Diagnosis:   Left foot pain (M79.672) Patient:  Milla Grayson (44 year old, female)        Referring Provider: Tawanda Quezada  Today's Date   2025    Precautions:  None   Date of Evaluation: 25  Next MD visit: No data recorded  Date of Surgery: NA     PATIENT SUMMARY   Summary of chief complaints: L lateral foot pain  History of current condition: The pt states in Aug she went to the [facility], doesn't remember a specific injury but wonders if she hurts it while in the [place]. States it was very cold so it is possible it was a little numbed. Later that day it started hurting with walking. States she went to the MD in Oct but pain has still not resolved.   Pain level: current 8 /10, at best 6 /10, at worst 10 /10  Description of symptoms: Pain is in lateral foot around 5th digit and into 5th  MT, some 4th MT pain as well. Pain is dorsal and lateral. Denies numbness or tingling. Pain is sharp   Occupation: Works at an , mostly sitting   Leisure activities/Hobbies: Walking, speed walking   Prior level of function: Independent  Current limitations: Walking, stairs, walking fast  Pt goals: To reduce pain  Past medical history was reviewed with Milla.  Significant findings include: NA  Imaging/Tests: X-ray: Nonspecific soft tissue inflammation over the left 5th metatarsophalangeal joint suggesting soft tissue injury.  No acute osseous abnormality of the left foot   Milla  has a past medical history of Ovarian cyst (2005) and Pure hypercholesterolemia (10/31/2022).  She  has a past surgical history that includes appendectomy (1998);  (, ); cholecystectomy (2012); and removal of ovarian cyst(s).    ASSESSMENT  Milla presents to physical therapy evaluation with primary c/o L lateral foot pain. The results of the objective tests and measures show Gait and posture abnormality, decreased foot and ankle ROM and strength, and balance  deficits.. Functional deficits include but are not limited to Walking, stairs, walking fast. Signs and symptoms are consistent with diagnosis of Left foot pain (M79.672). Pt and PT discussed evaluation findings, pathology, POC and HEP.  Pt voiced understanding and performs HEP correctly without reported pain. Skilled Physical Therapy is medically necessary to address the above impairments and reach functional goals.    OBJECTIVE:   Musculoskeletal  Observation: knee valgus     CLIVE ROM WNL and Strength (5/5) except below:  (* denotes performed with pain)  Ankle/Foot   ROM MMT (-/5)    R L R L     PF 64 64 5 4     DF (L4) 4 -5 5 5     Inversion 40 28 5 5     Eversion 10 8 5 5    5th Toe Ext (L5)     5 5   5th Toe Flex 48 26* 5 4*       Balance and Functional Mobility:  Gait: pt ambulates on level ground with -- (increased an early L foot pronation)   Balance: SLS: R 30 sec,  L 30 sec (increased sway vs R)    Today's Treatment and Response:   Pt education was provided on exam findings, treatment diagnosis, treatment plan, expectations, and prognosis.  Today's Treatment       2/5/2025   PT Treatment   Therapeutic Exercise Long sitting gastroc stretch 3x30 sec  Towel scrunches x30   Towel wipers x30  Seated heel raises x30   Therapeutic Exercise Min 15   Evaluation Min 30   Total of Timed Procedures 15   Total of Service Based 30   Total Treatment Time 45         Patient was instructed in and issued a HEP for: Long sitting gastroc stretch 3x30 sec  Towel scrunches x30   Towel wipers x30  Seated heel raises x30    Charges:  PT EVAL: Low Complexity, Eval x1 TEx1  In agreement with evaluation findings and clinical rationale, this evaluation involved LOW COMPLEXITY decision making due to no personal factors/comorbidities, 1-2 body structures involved/activity limitations, and stable symptoms as documented in the evaluation.                                                                                                                 PLAN OF CARE:    Goals: (to be met in 5 visits)   Goals       Therapy Goals      Not Met Progress Toward Partially Met Met   Pt will demonstrate improved DF AROM to >4 degrees to promote proper foot clearance during gait and greater ease descending stairs without compensation. [] [] [] []   Pt will have increased foot and ankle strength to 5/5 throughout for improved ankle control with ADLs such as prolonged gait and stair negotiation. [] [] [] []   Pt will report <2/10 pain with work and home activities such as walking and stairs. [] [] [] []   Pt will be independent and compliant with comprehensive HEP to maintain progress achieved in PT. [] [] [] []                   Frequency / Duration: Patient will be seen 1-2x/week or a total of 5  visits over a 90 day period. Treatment will include: Gait training; Manual Therapy; Neuromuscular Re-education; Therapeutic Activities; Therapeutic Exercise; Home Exercise Program instruction    Education or treatment limitation: None   Rehab Potential: good     LEFS Score  LEFS Score: (Proxy-Rptd) 52.5 % (2/5/2025  2:53 PM)      Patient/Family/Caregiver was advised of these findings, precautions, and treatment options and has agreed to actively participate in planning and for this course of care.    Thank you for your referral. Please co-sign or sign and return this letter via fax as soon as possible to [phone]. If you have any questions, please contact me at Dept: [phone]    Sincerely,  Electronically signed by therapist: Clara Meyers, PT  Physician's certification required: Yes  I certify the need for these services furnished under this plan of treatment and while under my care.    X___________________________________________________ Date____________________    Certification From: 2/5/2025  To:5/6/2025

## 2025-02-11 ENCOUNTER — OFFICE VISIT (OUTPATIENT)
Dept: PHYSICAL THERAPY | Facility: HOSPITAL | Age: 45
End: 2025-02-11
Attending: FAMILY MEDICINE
Payer: COMMERCIAL

## 2025-02-11 PROCEDURE — 97110 THERAPEUTIC EXERCISES: CPT

## 2025-02-11 PROCEDURE — 97112 NEUROMUSCULAR REEDUCATION: CPT

## 2025-02-11 PROCEDURE — 97140 MANUAL THERAPY 1/> REGIONS: CPT

## 2025-02-11 NOTE — PROGRESS NOTES
Patient: Milla Grayson (44 year old, female) Referring Provider:  Insurance:   Diagnosis: Left foot pain (M79.672) [facility]  [facility]   Date of Surgery: NA Next MD visit:  N/A   Precautions:  None No data recorded Referral Information:    Date of Evaluation: Req: 5, Auth: 5, Exp: 4/30/2025 02/05/25 POC Auth Visits:  5       Today's Date   2/11/2025    Subjective  The pt states she has been doing her HEP and has been feeling better. States over the weekend she was doing a lot of cleaning around the house and had some pain but feels back to normal right now. States she has also been trying to walk normally.       Pain: 7/10     Objective  From IE    Observation: knee valgus                 CLIVE ROM WNL and Strength (5/5) except below:  (* denotes performed with pain)         Ankle/Foot    ROM MMT (-/5)    R L R L     PF 64 64 5 4     DF (L4) 4 -5 5 5     Inversion 40 28 5 5     Eversion 10 8 5 5    5th Toe Ext (L5)   5 5   5th Toe Flex 48 26* 5 4*         Balance and Functional Mobility:  Gait: pt ambulates on level ground with -- (increased an early L foot pronation)       Balance: SLS: R 30 sec,  L 30 sec (increased sway vs R)        Assessment  The pt continues to have pain with 4th and 5th toe flex and ext but this lessens with with reps and is likely related to stiffness. 5th toe flexion ROM improved with treatment, and she has improved ability to tolerate lateral foot WBing today compared to eval.    Goals (to be met in 5 visits)   Goals       Therapy Goals      Not Met Progress Toward Partially Met Met   Pt will demonstrate improved DF AROM to >4 degrees to promote proper foot clearance during gait and greater ease descending stairs without compensation. [] [] [] []   Pt will have increased foot and ankle strength to 5/5 throughout for improved ankle control with ADLs such as prolonged gait and stair negotiation. [] [] [] []   Pt will report <2/10 pain with work and home activities such as walking and  stairs. [] [] [] []   Pt will be independent and compliant with comprehensive HEP to maintain progress achieved in PT. [] [] [] []                   Plan  Cont per POC    Treatment Last 4 Visits       2/5/2025 2/11/2025   PT Treatment   Treatment Day  2   Therapeutic Exercise Long sitting gastroc stretch 3x30 sec  Towel scrunches x30   Towel wipers x30  Seated heel raises x30 Long sitting gastroc stretch 3x30 sec  Towel scrunches x30   Towel wipers x30  Shuttle heel raise 25 lbs x30 B  Shuttle SL heel raise 25 lbs x30 L  Slantboard stretch 2 min     Neuro Re-Ed  Seated heel raises x30  Seated toe spreading x30  Seated BAPS board AP and ML taps, CW and CCW circles x30 L  SLS on airex 3x30 sec L     Manual Therapy  PROM L ankle DF, Inver, Toe 3-5 Flex, Ext  Gr III Intermetatarsal 3-5 mobilization     Therapeutic Exercise Min 15 15   Neuro Re-Ed Min  15   Manual Therapy Min  15   Evaluation Min 30    Total of Timed Procedures 15 45   Total of Service Based 30 0   Total Treatment Time 45 45         HEP  Long sitting gastroc stretch 3x30 sec  Towel scrunches x30   Towel wipers x30  Seated heel raises x30    Charges     MTx1 TEx1 NMRx1

## 2025-02-13 ENCOUNTER — OFFICE VISIT (OUTPATIENT)
Dept: PHYSICAL THERAPY | Facility: HOSPITAL | Age: 45
End: 2025-02-13
Attending: FAMILY MEDICINE
Payer: COMMERCIAL

## 2025-02-13 PROCEDURE — 97140 MANUAL THERAPY 1/> REGIONS: CPT

## 2025-02-13 PROCEDURE — 97112 NEUROMUSCULAR REEDUCATION: CPT

## 2025-02-13 PROCEDURE — 97110 THERAPEUTIC EXERCISES: CPT

## 2025-02-13 NOTE — PROGRESS NOTES
Patient: Milla Grayson (44 year old, female) Referring Provider:  Insurance:   Diagnosis: Left foot pain (M79.672) Tawanda Qeuzada  [facility]   Date of Surgery: NA Next MD visit:  N/A   Precautions:  None No data recorded Referral Information:    Date of Evaluation: Req: 5, Auth: 5, Exp: 4/30/2025 02/05/25 POC Auth Visits:  5       Today's Date   2/13/2025    Subjective  The pt reports she is a little today, still from the last session but this is mostly in the calf from the heel raises.       Pain: 8/10     Objective  From IE    Observation: knee valgus                 CLIVE ROM WNL and Strength (5/5) except below:  (* denotes performed with pain)         Ankle/Foot    ROM MMT (-/5)    R L R L     PF 64 64 5 4     DF (L4) 4 -5 5 5     Inversion 40 28 5 5     Eversion 10 8 5 5    5th Toe Ext (L5)   5 5   5th Toe Flex 48 26* 5 4*         Balance and Functional Mobility:  Gait: pt ambulates on level ground with -- (increased an early L foot pronation)       Balance: SLS: R 30 sec,  L 30 sec (increased sway vs R)        Assessment  The pt continues to progress with WBing and is tolerating increased lateral WBing. PROM and strength are also improving.    Goals (to be met in 5 visits)   Goals       Therapy Goals      Not Met Progress Toward Partially Met Met   Pt will demonstrate improved DF AROM to >4 degrees to promote proper foot clearance during gait and greater ease descending stairs without compensation. [] [] [] []   Pt will have increased foot and ankle strength to 5/5 throughout for improved ankle control with ADLs such as prolonged gait and stair negotiation. [] [] [] []   Pt will report <2/10 pain with work and home activities such as walking and stairs. [] [] [] []   Pt will be independent and compliant with comprehensive HEP to maintain progress achieved in PT. [] [] [] []                   Plan  Cont per POC    Treatment Last 4 Visits       2/5/2025 2/11/2025 2/13/2025   PT Treatment   Treatment Day  2 3    Therapeutic Exercise Long sitting gastroc stretch 3x30 sec  Towel scrunches x30   Towel wipers x30  Seated heel raises x30 Long sitting gastroc stretch 3x30 sec  Towel scrunches x30   Towel wipers x30  Shuttle heel raise 25 lbs x30 B  Shuttle SL heel raise 25 lbs x30 L  Slantboard stretch 2 min   4 way ankle red tb x30  Shuttle heel raise 25 lbs x30 B   Shuttle SL heel raise 25 lbs x30 L   Slantboard stretch 2 min      Neuro Re-Ed  Seated heel raises x30  Seated toe spreading x30  Seated BAPS board AP and ML taps, CW and CCW circles x30 L  SLS on airex 3x30 sec L   Seated BAPS board AP and ML taps, CW and CCW circles x30 L   SLS L stance with fwd and retro cone tap, and med and lat cone tap x30 ea   SLS on airex 3x30 sec L      Manual Therapy  PROM L ankle DF, Inver, Toe 3-5 Flex, Ext  Gr III Intermetatarsal 3-5 mobilization   PROM L ankle DF, Inver, Toe 3-5 Flex, Ext   Gr III Intermetatarsal 3-5 mobilization      Therapeutic Exercise Min 15 15 10   Neuro Re-Ed Min  15 20   Manual Therapy Min  15 15   Evaluation Min 30     Total of Timed Procedures 15 45 45   Total of Service Based 30 0 0   Total Treatment Time 45 45 45         HEP  Long sitting gastroc stretch 3x30 sec  Towel scrunches x30   Towel wipers x30  Seated heel raises x30    Charges     MTx1 NMRx1 TEx1

## 2025-02-18 ENCOUNTER — OFFICE VISIT (OUTPATIENT)
Dept: PHYSICAL THERAPY | Facility: HOSPITAL | Age: 45
End: 2025-02-18
Attending: FAMILY MEDICINE
Payer: COMMERCIAL

## 2025-02-18 PROCEDURE — 97110 THERAPEUTIC EXERCISES: CPT

## 2025-02-18 PROCEDURE — 97112 NEUROMUSCULAR REEDUCATION: CPT

## 2025-02-18 PROCEDURE — 97140 MANUAL THERAPY 1/> REGIONS: CPT

## 2025-02-18 NOTE — PROGRESS NOTES
Patient: Milla Grayson (44 year old, female) Referring Provider:  Insurance:   Diagnosis: Left foot pain (M79.672) Taw[facility] Que[facility]ada  [facility]   Date of Surgery: NA Next MD visit:  N/A   Precautions:  None No data recorded Referral Information:    Date of Evaluation: Req: 5, Auth: 5, Exp: 4/30/2025 02/05/25 POC Auth Visits:  5       Today's Date   2/18/2025    Subjective  The pt states she is having less pain today. Overall has been feeling pretty good.       Pain: 7/10     Objective  From IE    Observation: knee valgus                 CLIVE ROM WNL and Strength (5/5) except below:  (* denotes performed with pain)         Ankle/Foot    ROM MMT (-/5)    R L R L     PF 64 64 5 4     DF (L4) 4 -5 5 5     Inversion 40 28 5 5     Eversion 10 8 5 5    5th Toe Ext (L5)   5 5   5th Toe Flex 48 26* 5 4*         Balance and Functional Mobility:  Gait: pt ambulates on level ground with -- (increased an early L foot pronation)       Balance: SLS: R 30 sec,  L 30 sec (increased sway vs R)       Assessment  The pt has improving carryover of pain relief between sessions. Lateral foot WBing continues to improve, allowing for improved gait mechanics.    Goals (to be met in 5 visits)   Goals       Therapy Goals      Not Met Progress Toward Partially Met Met   Pt will demonstrate improved DF AROM to >4 degrees to promote proper foot clearance during gait and greater ease descending stairs without compensation. [] [] [] []   Pt will have increased foot and ankle strength to 5/5 throughout for improved ankle control with ADLs such as prolonged gait and stair negotiation. [] [] [] []   Pt will report <2/10 pain with work and home activities such as walking and stairs. [] [] [] []   Pt will be independent and compliant with comprehensive HEP to maintain progress achieved in PT. [] [] [] []                   Plan  Cont per POC    Treatment Last 4 Visits       2/5/2025 2/11/2025 2/13/2025 2/18/2025   PT Treatment   Treatment Day  2 3 4    Therapeutic Exercise Long sitting gastroc stretch 3x30 sec  Towel scrunches x30   Towel wipers x30  Seated heel raises x30 Long sitting gastroc stretch 3x30 sec  Towel scrunches x30   Towel wipers x30  Shuttle heel raise 25 lbs x30 B  Shuttle SL heel raise 25 lbs x30 L  Slantboard stretch 2 min   4 way ankle red tb x30  Shuttle heel raise 25 lbs x30 B   Shuttle SL heel raise 25 lbs x30 L   Slantboard stretch 2 min    4 way ankle red tb x30   Shuttle heel raise 25 lbs x30 B   Shuttle SL heel raise 25 lbs x30 L   Slantboard stretch 2 min      Neuro Re-Ed  Seated heel raises x30  Seated toe spreading x30  Seated BAPS board AP and ML taps, CW and CCW circles x30 L  SLS on airex 3x30 sec L   Seated BAPS board AP and ML taps, CW and CCW circles x30 L   SLS L stance with fwd and retro cone tap, and med and lat cone tap x30 ea   SLS on airex 3x30 sec L    Seated BAPS board AP and ML taps, CW and CCW circles x30 L   SLS L stance with fwd and retro cone tap, and med and lat cone tap x30 ea   SLS on airex 3x30 sec L      Manual Therapy  PROM L ankle DF, Inver, Toe 3-5 Flex, Ext  Gr III Intermetatarsal 3-5 mobilization   PROM L ankle DF, Inver, Toe 3-5 Flex, Ext   Gr III Intermetatarsal 3-5 mobilization    PROM L ankle DF, Inver, Toe 3-5 Flex, Ext   Gr III Intermetatarsal 3-5 mobilization      Therapeutic Exercise Min 15 15 10 10   Neuro Re-Ed Min  15 20 15   Manual Therapy Min  15 15 15   Evaluation Min 30      Total of Timed Procedures 15 45 45 40   Total of Service Based 30 0 0 0   Total Treatment Time 45 45 45 40         HEP  Long sitting gastroc stretch 3x30 sec  Towel scrunches x30   Towel wipers x30  Seated heel raises x30    Charges     MTx1 TEx1 NMRx1

## 2025-02-20 ENCOUNTER — OFFICE VISIT (OUTPATIENT)
Dept: PHYSICAL THERAPY | Facility: HOSPITAL | Age: 45
End: 2025-02-20
Attending: FAMILY MEDICINE
Payer: COMMERCIAL

## 2025-02-20 PROCEDURE — 97110 THERAPEUTIC EXERCISES: CPT

## 2025-02-20 PROCEDURE — 97140 MANUAL THERAPY 1/> REGIONS: CPT

## 2025-02-20 PROCEDURE — 97112 NEUROMUSCULAR REEDUCATION: CPT

## 2025-02-20 NOTE — PROGRESS NOTES
Discharge Summary  Pt has attended 5 visits in Physical Therapy.     Patient: Milla Grayson (44 year old, female) Referring Provider:  Insurance:   Diagnosis: Left foot pain (M79.672) Tawanda Quezada  [facility]   Date of Surgery: NA Next MD visit:  N/A   Precautions:  None No data recorded Referral Information:    Date of Evaluation: Req: 5, Auth: 5, Exp: 4/30/2025 02/05/25 POC Auth Visits:  5       Today's Date   2/20/2025    Subjective  The pt states overall she feels iprovement and that things are feeling better. Still having some pain but feels ready to DC and is comfortable with HEP.       Pain: 7/10     Objective       2/20/25:  Observation: knee valgus                 CLIVE ROM WNL and Strength (5/5) except below:  (* denotes performed with pain)         Ankle/Foot    ROM MMT (-/5)    R L R L     PF 64 70 5 5*     DF (L4) 4 2 5 5     Inversion 40 34 5 5     Eversion 10 12 5 5    5th Toe Ext (L5)   5 5   5th Toe Flex 48 32 5 4+*         Balance and Functional Mobility:  Gait: pt ambulates on level ground with -- (increased an early L foot pronation)       Balance: SLS: R 30 sec,  L 30 sec        Assessment  The pt presents with decreased pain, improved gait, and increased ROM, strength and balance. She has improved functional tolerance and is independent with her HEP.    Goals (to be met in 5 visits)   Goals       Therapy Goals      Not Met Progress Toward Partially Met Met   Pt will demonstrate improved DF AROM to >4 degrees to promote proper foot clearance during gait and greater ease descending stairs without compensation. [] [] [x] []   Pt will have increased foot and ankle strength to 5/5 throughout for improved ankle control with ADLs such as prolonged gait and stair negotiation. [] [] [] [x]   Pt will report <2/10 pain with work and home activities such as walking and stairs. [] [] [x] []   Pt will be independent and compliant with comprehensive HEP to maintain progress achieved in PT. [] [] [] [x]                  Post LEFS Score  Post LEFS Score: 60 % (2/20/2025  3:35 PM)    7.5 % improvement    Plan: DC to HEP  Treatment Last 4 Visits       2/11/2025 2/13/2025 2/18/2025 2/20/2025   PT Treatment   Treatment Day 2 3 4 5   Therapeutic Exercise Long sitting gastroc stretch 3x30 sec  Towel scrunches x30   Towel wipers x30  Shuttle heel raise 25 lbs x30 B  Shuttle SL heel raise 25 lbs x30 L  Slantboard stretch 2 min   4 way ankle red tb x30  Shuttle heel raise 25 lbs x30 B   Shuttle SL heel raise 25 lbs x30 L   Slantboard stretch 2 min    4 way ankle red tb x30   Shuttle heel raise 25 lbs x30 B   Shuttle SL heel raise 25 lbs x30 L   Slantboard stretch 2 min    Reassessment  Shuttle heel raise 25 lbs x30 B   Shuttle SL heel raise 25 lbs x30 L   Slantboard stretch 2 min      Neuro Re-Ed Seated heel raises x30  Seated toe spreading x30  Seated BAPS board AP and ML taps, CW and CCW circles x30 L  SLS on airex 3x30 sec L   Seated BAPS board AP and ML taps, CW and CCW circles x30 L   SLS L stance with fwd and retro cone tap, and med and lat cone tap x30 ea   SLS on airex 3x30 sec L    Seated BAPS board AP and ML taps, CW and CCW circles x30 L   SLS L stance with fwd and retro cone tap, and med and lat cone tap x30 ea   SLS on airex 3x30 sec L    Seated BAPS board AP and ML taps, CW and CCW circles x30 L   SLS on airex 3x30 sec L      Manual Therapy PROM L ankle DF, Inver, Toe 3-5 Flex, Ext  Gr III Intermetatarsal 3-5 mobilization   PROM L ankle DF, Inver, Toe 3-5 Flex, Ext   Gr III Intermetatarsal 3-5 mobilization    PROM L ankle DF, Inver, Toe 3-5 Flex, Ext   Gr III Intermetatarsal 3-5 mobilization    PROM L ankle DF, Inver, Toe 3-5 Flex, Ext   Gr III Intermetatarsal 3-5 mobilization      Therapeutic Exercise Min 15 10 10 20   Neuro Re-Ed Min 15 20 15 8   Manual Therapy Min 15 15 15 15   Total of Timed Procedures 45 45 40 43   Total of Service Based 0 0 0 0   Total Treatment Time 45 45 40 43         HEP  Long sitting  gastroc stretch 3x30 sec  Towel scrunches x30   Towel wipers x30  Seated heel raises x30    Charges     MTx1 TEx1 NMRx1

## 2025-03-12 ENCOUNTER — OFFICE VISIT (OUTPATIENT)
Dept: FAMILY MEDICINE CLINIC | Facility: CLINIC | Age: 45
End: 2025-03-12
Payer: COMMERCIAL

## 2025-03-12 VITALS
BODY MASS INDEX: 29.27 KG/M2 | WEIGHT: 155 LBS | SYSTOLIC BLOOD PRESSURE: 122 MMHG | RESPIRATION RATE: 16 BRPM | OXYGEN SATURATION: 99 % | HEART RATE: 74 BPM | HEIGHT: 61 IN | TEMPERATURE: 98 F | DIASTOLIC BLOOD PRESSURE: 82 MMHG

## 2025-03-12 DIAGNOSIS — L01.00 IMPETIGO: Primary | ICD-10-CM

## 2025-03-12 PROCEDURE — 99213 OFFICE O/P EST LOW 20 MIN: CPT | Performed by: NURSE PRACTITIONER

## 2025-03-12 PROCEDURE — 3074F SYST BP LT 130 MM HG: CPT | Performed by: NURSE PRACTITIONER

## 2025-03-12 PROCEDURE — 3079F DIAST BP 80-89 MM HG: CPT | Performed by: NURSE PRACTITIONER

## 2025-03-12 PROCEDURE — 3008F BODY MASS INDEX DOCD: CPT | Performed by: NURSE PRACTITIONER

## 2025-03-12 RX ORDER — MUPIROCIN 20 MG/G
1 OINTMENT TOPICAL 3 TIMES DAILY
Qty: 30 G | Refills: 0 | Status: SHIPPED | OUTPATIENT
Start: 2025-03-12 | End: 2025-03-22

## 2025-03-12 NOTE — PROGRESS NOTES
Subjective:   Patient ID: Milla Grayson is a 44 year old female.    Milla is a 44 y.o. F presented today with a lesion under her nose. She reports an area under her right nare that is crusty, itchy, and tingling. She has an area on her right cheek that has started to itch and tingle as well. She denies pain around the lesion, pain or irritation around the eyes or ears, recent illness, n/v/d, contacts with rash, history of eczema, new skin products, or recent travel. 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 She reports having chickenpox as a child. She has not tried treatment to the area at home.        History/Other:   Review of Systems   Constitutional:  Negative for activity change, chills, fatigue and fever.   HENT:  Negative for congestion, rhinorrhea and sore throat.    Eyes:  Negative for pain and itching.   Respiratory:  Negative for cough and shortness of breath.    Gastrointestinal:  Negative for diarrhea, nausea and vomiting.   Skin:  Positive for rash.     Current Outpatient Medications   Medication Sig Dispense Refill    mupirocin 2 % External Ointment Apply 1 Application topically 3 (three) times daily for 10 days. 30 g 0    Phentermine HCl 30 MG Oral Cap Take 1 capsule (30 mg total) by mouth every morning.      Multiple Vitamins-Minerals (MULTI-VITAMIN/MINERALS) Oral Tab Take 1 tablet by mouth daily.      Ascorbic Acid (VITAMIN C) 1000 MG Oral Tab Take 1 tablet (1,000 mg total) by mouth daily.       Allergies:Allergies[1]    /82   Pulse 74   Temp 98.3 °F (36.8 °C) (Oral)   Resp 16   Ht 5' 1\" (1.549 m)   Wt 155 lb (70.3 kg)   LMP 02/20/2025 (Approximate)   SpO2 99%   BMI 29.29 kg/m²       Objective:   Physical Exam  Vitals reviewed.   Constitutional:       General: She is awake. She is not in acute distress.     Appearance: Normal appearance. She is well-developed and well-groomed. She is not ill-appearing or toxic-appearing.   HENT:      Head: Normocephalic.      Right Ear: Hearing, tympanic  membrane, ear canal and external ear normal.      Left Ear: Hearing, tympanic membrane, ear canal and external ear normal.      Nose: Nose normal.      Mouth/Throat:      Lips: Pink.      Mouth: Mucous membranes are moist. No oral lesions.      Pharynx: Oropharynx is clear. Uvula midline. No pharyngeal swelling, oropharyngeal exudate or posterior oropharyngeal erythema.      Tonsils: No tonsillar exudate. 0 on the right. 0 on the left.   Cardiovascular:      Rate and Rhythm: Normal rate and regular rhythm.      Heart sounds: Normal heart sounds, S1 normal and S2 normal.   Pulmonary:      Effort: Pulmonary effort is normal. No tachypnea or respiratory distress.      Breath sounds: Normal breath sounds and air entry. No decreased breath sounds, wheezing, rhonchi or rales.   Lymphadenopathy:      Head:      Right side of head: No submental, submandibular, tonsillar or occipital adenopathy.      Left side of head: No submental, submandibular, tonsillar or occipital adenopathy.   Skin:     General: Skin is warm and dry.      Findings: Rash present. Rash is crusting and macular. Rash is not vesicular.      Comments: See photo.   Neurological:      Mental Status: She is alert and oriented to person, place, and time.   Psychiatric:         Attention and Perception: Attention and perception normal.         Mood and Affect: Mood and affect normal.         Speech: Speech normal.         Behavior: Behavior normal. Behavior is cooperative.         Thought Content: Thought content normal.               Assessment & Plan:   1. Impetigo        No orders of the defined types were placed in this encounter.      Meds This Visit:  Requested Prescriptions     Signed Prescriptions Disp Refills    mupirocin 2 % External Ointment 30 g 0     Sig: Apply 1 Application topically 3 (three) times daily for 10 days.     Reassuring physical exam findings. Vitals WNL.   Clinical exam findings consistent with impetigo.  Mupirocin ointment  prescribed.  Patient v/u and is comfortable with this plan.    Patient Instructions   Keep skin clean  Apply mupirocin as prescribed  Wash hands frequently  Use separate towels from others in the home.  Wash pillow cases.  Return to care for new or worsening symptoms.         ROSALEE Velásquez student    I certify that I have supervised the student in his/her medical examination and care of this patient. The above documentation was carefully reviewed by me.    ROSALEE Cho         [1] No Known Allergies

## 2025-03-12 NOTE — PATIENT INSTRUCTIONS
Keep skin clean  Apply mupirocin as prescribed  Wash hands frequently  Use separate towels from others in the home.  Wash pillow cases.  Return to care for new or worsening symptoms.

## 2025-03-16 ENCOUNTER — OFFICE VISIT (OUTPATIENT)
Dept: FAMILY MEDICINE CLINIC | Facility: CLINIC | Age: 45
End: 2025-03-16
Payer: COMMERCIAL

## 2025-03-16 VITALS
RESPIRATION RATE: 18 BRPM | OXYGEN SATURATION: 100 % | TEMPERATURE: 97 F | SYSTOLIC BLOOD PRESSURE: 116 MMHG | DIASTOLIC BLOOD PRESSURE: 68 MMHG | WEIGHT: 155 LBS | HEART RATE: 90 BPM | BODY MASS INDEX: 29.27 KG/M2 | HEIGHT: 61 IN

## 2025-03-16 DIAGNOSIS — R23.8 VESICULAR RASH: Primary | ICD-10-CM

## 2025-03-16 PROCEDURE — 87529 HSV DNA AMP PROBE: CPT | Performed by: NURSE PRACTITIONER

## 2025-03-16 PROCEDURE — 3078F DIAST BP <80 MM HG: CPT | Performed by: NURSE PRACTITIONER

## 2025-03-16 PROCEDURE — 3074F SYST BP LT 130 MM HG: CPT | Performed by: NURSE PRACTITIONER

## 2025-03-16 PROCEDURE — 3008F BODY MASS INDEX DOCD: CPT | Performed by: NURSE PRACTITIONER

## 2025-03-16 PROCEDURE — 99213 OFFICE O/P EST LOW 20 MIN: CPT | Performed by: NURSE PRACTITIONER

## 2025-03-16 RX ORDER — VALACYCLOVIR HYDROCHLORIDE 1 G/1
TABLET, FILM COATED ORAL
Qty: 21 TABLET | Refills: 0 | Status: SHIPPED | OUTPATIENT
Start: 2025-03-16

## 2025-03-16 NOTE — PROGRESS NOTES
CHIEF COMPLAINT:     Chief Complaint   Patient presents with    Rash     Follow-up from last visit now there is discoloration/scab        HPI:     Milla Grayson is a 44 year old female who presents with concerns of continued skin infection beneath right nare.  Initial onset 8 days ago.  Was seen in [facility] 4 days ago and diagnosed with impetigo.  The mupirocin hasn't really been helping, the scabbing looks worse and the second lesion area looks worse.  Also feels a tingling, crawling sensation in the affected area.  Was itchy at onset, not really painful.  Denies prior Hx cold sores or similar lesions in this area.  Has been under some stress, no recent illness.  Denies fever, swollen glands, systemic rash.    Current Outpatient Medications   Medication Sig Dispense Refill    valACYclovir 1 G Oral Tab Take 1 tablet PO every 8 hours for 7 days 21 tablet 0    mupirocin 2 % External Ointment Apply 1 Application topically 3 (three) times daily for 10 days. 30 g 0    Phentermine HCl 30 MG Oral Cap Take 1 capsule (30 mg total) by mouth every morning.      Multiple Vitamins-Minerals (MULTI-VITAMIN/MINERALS) Oral Tab Take 1 tablet by mouth daily.      Ascorbic Acid (VITAMIN C) 1000 MG Oral Tab Take 1 tablet (1,000 mg total) by mouth daily.        Past Medical History:    Ovarian cyst    ovarian cyst with 1st pregnancy - removed    Pure hypercholesterolemia      Social History:  Social History     Socioeconomic History    Marital status:    Tobacco Use    Smoking status: Never    Smokeless tobacco: Never   Substance and Sexual Activity    Alcohol use: No    Drug use: No    Sexual activity: Yes     Partners: Male   Other Topics Concern    Breast feeding No    Reaction to local anesthetic No    Pt has a pacemaker No    Pt has a defibrillator No   Social History Narrative    Relationships:  - Norman*     Children: 2 boys - Norman (18M), Cameron (16M)    Pets: None    School: N/A    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 Came to [place] in 1989.     Interests: Enjoys being at home, listening at home, watching movies.     Spiritual: Sabianist - important.          Social Drivers of Health      Received from [facility]        REVIEW OF SYSTEMS:   GENERAL: feels well otherwise, no fever, no chills.  SKIN: as above.  CHEST: no chest pains, no palpitations.  LUNGS: denies shortness of breath with exertion or rest. No wheezing, no cough.  LYMPH: No enlargement of the lymph nodes.  MUSC/SKEL: no joint swelling, no joint stiffness.  CARDIOVASCULAR: denies chest pain on exertion or rest.  GI: no nausea, no vomiting or abdominal pain  NEURO: no abnormal sensation, no tingling of the skin or numbness.    EXAM:   /68   Pulse 90   Temp 97 °F (36.1 °C)   Resp 18   Ht 5' 1\" (1.549 m)   Wt 155 lb (70.3 kg)   LMP 02/20/2025 (Approximate)   SpO2 100%   BMI 29.29 kg/m²   GENERAL: Well-appearing, well developed, well nourished, and in no apparent distress  SKIN: +One scabbed lesion below right nare, one proximal vesicular and erythematous lesion.  See picture.  No honey crusting or drainage.  HEAD: atraumatic, normocephalic  NOSE: Normal external nose.  No rhinorrhea.  MOUTH: Moist oral mucosa. No lesions.   NECK: supple, non-tender  LYMPH: No lymphadenopathy.          ASSESSMENT AND PLAN:     ASSESSMENT:   Encounter Diagnosis   Name Primary?    Vesicular rash Yes       PLAN:   - Suspect HSV vs zoster rash over impetigo.    - Will add antiviral as below.  - Discussed contagiousness and to avoid touching lesion.  - Skin care discussed with patient.   - Advised pt to follow up with dermatology if no improvement/worsening within 5-7 days.    - The patient indicates understanding of these issues and agrees to the plan.    Requested Prescriptions     Signed Prescriptions Disp Refills    valACYclovir 1 G Oral Tab 21 tablet 0     Sig: Take 1 tablet PO every 8 hours for 7 days    Medication side effects/instructions reviewed.  Pt verbalizes understanding.    There are no Patient Instructions on file for this visit.

## 2025-03-20 ENCOUNTER — PATIENT MESSAGE (OUTPATIENT)
Dept: PHYSICAL THERAPY | Facility: HOSPITAL | Age: 45
End: 2025-03-20

## 2025-03-20 LAB
HSV 1 NAA: NEGATIVE
HSV 1 NAA: NEGATIVE
HSV 2 NAA: NEGATIVE
HSV 2 NAA: NEGATIVE

## 2025-03-24 ENCOUNTER — OFFICE VISIT (OUTPATIENT)
Dept: DERMATOLOGY CLINIC | Facility: CLINIC | Age: 45
End: 2025-03-24

## 2025-03-24 VITALS — WEIGHT: 155 LBS | HEIGHT: 61 IN | BODY MASS INDEX: 29.27 KG/M2

## 2025-03-24 DIAGNOSIS — L01.00 IMPETIGO: ICD-10-CM

## 2025-03-24 DIAGNOSIS — B00.1 COLD SORE: Primary | ICD-10-CM

## 2025-03-24 PROBLEM — R63.5 WEIGHT GAIN: Status: ACTIVE | Noted: 2025-03-24

## 2025-03-24 PROBLEM — R68.89 ABNORMAL EXAMINATION: Status: ACTIVE | Noted: 2025-03-24

## 2025-03-24 PROCEDURE — 99213 OFFICE O/P EST LOW 20 MIN: CPT | Performed by: PHYSICIAN ASSISTANT

## 2025-03-24 PROCEDURE — 3008F BODY MASS INDEX DOCD: CPT | Performed by: PHYSICIAN ASSISTANT

## 2025-03-24 RX ORDER — VALACYCLOVIR HYDROCHLORIDE 500 MG/1
0.5 TABLET, FILM COATED ORAL 2 TIMES DAILY
Qty: 60 TABLET | Refills: 0 | Status: SHIPPED | OUTPATIENT
Start: 2025-03-24

## 2025-03-24 RX ORDER — MUPIROCIN 20 MG/G
OINTMENT TOPICAL 3 TIMES DAILY
COMMUNITY

## 2025-03-24 NOTE — PROGRESS NOTES
HPI:    Patient ID: Milla Grayson is a 44 year old female.    Patient presents for follow-up from urgent care for cold sore and rash on the face for the past 2 weeks. Was given mupirocin and valtrex. NO draining or tenderness noted. No allergies to medications noted. Feels valtrex has helped dry out the lesion by the nose. UC did do a culture which was negative for HSV.         Review of Systems   Constitutional:  Negative for chills and fever.   Musculoskeletal:  Negative for arthralgias and myalgias.   Skin:  Positive for rash. Negative for color change and wound.            Current Outpatient Medications   Medication Sig Dispense Refill    mupirocin 2 % External Ointment Apply topically 3 (three) times daily.      valACYclovir (VALTREX) 500 MG Oral Tab Take 1 tablet (500 mg total) by mouth 2 (two) times daily. 60 tablet 0    Phentermine HCl 30 MG Oral Cap Take 1 capsule (30 mg total) by mouth every morning.      Multiple Vitamins-Minerals (MULTI-VITAMIN/MINERALS) Oral Tab Take 1 tablet by mouth daily.      Ascorbic Acid (VITAMIN C) 1000 MG Oral Tab Take 1 tablet (1,000 mg total) by mouth daily.      valACYclovir 1 G Oral Tab Take 1 tablet PO every 8 hours for 7 days (Patient not taking: Reported on 3/24/2025) 21 tablet 0     Allergies:Allergies[1]   Ht 61\"   Wt 155 lb (70.3 kg)   LMP 02/20/2025 (Approximate)   BMI 29.29 kg/m²   Body mass index is 29.29 kg/m².  PHYSICAL EXAM:   Physical Exam  Constitutional:       General: She is not in acute distress.     Appearance: Normal appearance.   Skin:     General: Skin is warm and dry.      Findings: Rash present.      Comments: Cold sore noted under the right nare. No draining or tendenress noted. No scaling noted. No erythema noted. Slight erythema noted on the right cheek.   Neurological:      Mental Status: She is alert and oriented to person, place, and time.                ASSESSMENT/PLAN:   1. Cold sore  -After discussion with patient, advised the  following:  -Continue valtrex  -Educated to take 1 pill 2 times per day for 1-2 weeks  -Should slowly heal on its own   -To call or follow-up with worsening symptoms or concerns  -Patient was agreeable to plan and will comply with discussion above.       2. Impetigo  -After discussion with patient, advised the following:  -Continue with mupirocin 2 times per day for 1 more week.   -To call or follow-up with worsening symptoms or concerns  -Patient was agreeable to plan and will comply with discussion above.         No orders of the defined types were placed in this encounter.      Meds This Visit:  Requested Prescriptions     Signed Prescriptions Disp Refills    valACYclovir (VALTREX) 500 MG Oral Tab 60 tablet 0     Sig: Take 1 tablet (500 mg total) by mouth 2 (two) times daily.       Imaging & Referrals:  None         ID#[id number]       [1] No Known Allergies

## 2025-03-25 NOTE — TELEPHONE ENCOUNTER
Just received message today, had been tasked to wrong provider.  Pt has already seen dermatology as of yesterday and valacyclovir prescribed.

## 2025-04-10 ENCOUNTER — OFFICE VISIT (OUTPATIENT)
Dept: DERMATOLOGY CLINIC | Facility: CLINIC | Age: 45
End: 2025-04-10

## 2025-04-10 DIAGNOSIS — B00.1 COLD SORE: Primary | ICD-10-CM

## 2025-04-10 PROCEDURE — 99213 OFFICE O/P EST LOW 20 MIN: CPT | Performed by: PHYSICIAN ASSISTANT

## 2025-04-10 NOTE — PROGRESS NOTES
HPI:    Patient ID: Milla Grayson is a 45 year old female.    Patient presents for follow-up on cold sore on the lip. States the scab came off but now there is a divet in the lip. Patient has been applying vaseline. Patient denies any pain or issues with the area. No draining or tendenress noted. No allergies to medications ntoed.         Review of Systems   Constitutional:  Negative for chills and fever.   Musculoskeletal:  Negative for arthralgias and myalgias.   Skin:  Positive for rash. Negative for color change and wound.          Current Medications[1]  Allergies:Allergies[2]   LMP 02/20/2025 (Approximate)   There is no height or weight on file to calculate BMI.  PHYSICAL EXAM:   Physical Exam  Constitutional:       General: She is not in acute distress.     Appearance: Normal appearance.   Skin:     General: Skin is warm and dry.      Findings: Rash present.      Comments: No draining or tenderness noted. No erythema noted. No scaling noted.    Neurological:      Mental Status: She is alert and oriented to person, place, and time.                ASSESSMENT/PLAN:   1. Cold sore  -After discussion with patient, advised the following:  -Continue applying vaseline. Healing well.   -Return in 1-2 months if still not healing well.   -To call or follow-up with worsening symptoms or concerns  -Patient was agreeable to plan and will comply with discussion above.         No orders of the defined types were placed in this encounter.      Meds This Visit:  Requested Prescriptions      No prescriptions requested or ordered in this encounter       Imaging & Referrals:  None         ID#[id number]       [1]   Current Outpatient Medications   Medication Sig Dispense Refill    mupirocin 2 % External Ointment Apply topically 3 (three) times daily. (Patient not taking: Reported on 4/10/2025)      valACYclovir (VALTREX) 500 MG Oral Tab Take 1 tablet (500 mg total) by mouth 2 (two) times daily. (Patient not taking: Reported on 4/10/2025)  60 tablet 0    valACYclovir 1 G Oral Tab Take 1 tablet PO every 8 hours for 7 days (Patient not taking: Reported on 4/10/2025) 21 tablet 0    Phentermine HCl 30 MG Oral Cap Take 1 capsule (30 mg total) by mouth every morning. (Patient not taking: Reported on 4/10/2025)      Multiple Vitamins-Minerals (MULTI-VITAMIN/MINERALS) Oral Tab Take 1 tablet by mouth daily.      Ascorbic Acid (VITAMIN C) 1000 MG Oral Tab Take 1 tablet (1,000 mg total) by mouth daily.     [2] No Known Allergies
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
## 2025-04-24 ENCOUNTER — OFFICE VISIT (OUTPATIENT)
Dept: INTERNAL MEDICINE CLINIC | Facility: CLINIC | Age: 45
End: 2025-04-24
Payer: COMMERCIAL

## 2025-04-24 VITALS
HEART RATE: 88 BPM | BODY MASS INDEX: 28.89 KG/M2 | TEMPERATURE: 97 F | DIASTOLIC BLOOD PRESSURE: 70 MMHG | OXYGEN SATURATION: 99 % | HEIGHT: 61 IN | WEIGHT: 153 LBS | SYSTOLIC BLOOD PRESSURE: 118 MMHG

## 2025-04-24 DIAGNOSIS — E66.3 OVERWEIGHT (BMI 25.0-29.9): ICD-10-CM

## 2025-04-24 DIAGNOSIS — Z00.00 HEALTH MAINTENANCE EXAMINATION: Primary | ICD-10-CM

## 2025-04-24 DIAGNOSIS — H53.9 VISUAL CHANGES: ICD-10-CM

## 2025-04-24 DIAGNOSIS — E78.00 PURE HYPERCHOLESTEROLEMIA: ICD-10-CM

## 2025-04-24 DIAGNOSIS — L72.0 EPIDERMAL CYST: ICD-10-CM

## 2025-04-24 DIAGNOSIS — M79.672 LEFT FOOT PAIN: ICD-10-CM

## 2025-04-24 DIAGNOSIS — B00.1 COLD SORE: ICD-10-CM

## 2025-04-24 PROBLEM — R74.8 ELEVATED LIVER ENZYMES: Status: RESOLVED | Noted: 2022-11-01 | Resolved: 2025-04-24

## 2025-04-24 PROBLEM — R63.5 WEIGHT GAIN: Status: RESOLVED | Noted: 2025-03-24 | Resolved: 2025-04-24

## 2025-04-24 PROBLEM — R68.89 ABNORMAL EXAMINATION: Status: RESOLVED | Noted: 2025-03-24 | Resolved: 2025-04-24

## 2025-04-24 PROBLEM — R07.89 RIGHT-SIDED CHEST WALL PAIN: Status: RESOLVED | Noted: 2022-11-10 | Resolved: 2025-04-24

## 2025-04-24 PROBLEM — L98.9 SKIN LESION: Status: ACTIVE | Noted: 2025-04-24

## 2025-04-24 RX ORDER — VALACYCLOVIR HYDROCHLORIDE 1 G/1
2000 TABLET, FILM COATED ORAL DAILY
Qty: 2 TABLET | Refills: 3 | Status: SHIPPED | OUTPATIENT
Start: 2025-04-24

## 2025-04-24 NOTE — ASSESSMENT & PLAN NOTE
Patient with recent skin lesion  Initially thought possible impetigo, but had no improvement with mupirocin.  Then concerns for possible herpes.  Notes improvement with valacyclovir and time.  I will prescribe valacylovir 2g once as needed for future cold sores.  Follow up as needed

## 2025-04-24 NOTE — ASSESSMENT & PLAN NOTE
Exercise and diet advised.  CBC, CMP, lipid panel  COVID vaccine advised.  Advanced directive information provided.  Colonoscopy referral provided.  Mammogram ordered previously - needs to schedule

## 2025-04-24 NOTE — ASSESSMENT & PLAN NOTE
Patient reports that her vision has been getting slightly worse over the years, now requiring reading glasses.

## 2025-04-24 NOTE — PATIENT INSTRUCTIONS
PATIENT INSTRUCTIONS    Thank you for seeing me today, it was a pleasure taking care of you.  Please check out at the  and schedule a follow up appointment.  Return in about 1 year (around 4/24/2026) for physical.  Please remember that the preferred luis alfredo period for appointments is 5 minutes. This is to help maximize the amount of time that we can spend together at our visits.    Please get your labs drawn at your preferred lab.  The following imaging studies were ordered: Mammogram  Please call [phone] to schedule your imaging appointment.   Please also follow up with the following specialists: Dermatology, GI  Please call [phone] to talk to an individual who will help you schedule your colonoscopy.  Please fill out the advance directive information (power of  documents) and bring a copy to our clinic.  Valtrex 2g once if with cold sore again in future  Diet and exercise        Best,   Dr. Damien MEJIA LABS AND IMAGING INFORMATION    Here are some lab and imaging locations available to you. Please note that some of the times and availabilities are subject to change. Please refer to the [facility] webpage for the most recent updates. There are also additional locations that can be found on the [facility] webpage.    To schedule a lab appointment, please call [phone].    To schedule an imaging appointment, please call [phone], option 2      [address] [address]    Lab Hours  Monday - Friday 7:30am - 5pm  Saturday 6:30am - 3pm    X-ray Hours  Call [phone] for hours or to schedule      [facility]  [address] [place] [address]    Lab Hours  Monday - Friday 6:30am - 8pm  Saturday 6:30am - 3pm  Sunday 6:30am - 3pm    X-ray Hours  Call [phone] for hours or to schedule      [address]  [place] [address]    Lab Hours  Monday - Friday 7:30am - 5pm  Saturday 7:30am - 11:30am    X-ray Hours  None at this location      [facility] [address] [address]    Lab Hours  Monday - Friday 8am - 12pm    X-ray Hours  Call [phone] for hours or to schedule      [facility] [place]  [address] [place] [address]    Lab Hours  Monday - Friday 7:30am - 5pm  Saturday 6:30am - 3pm    X-ray Hours  Call [phone] for hours or to schedule      [facility] [place]  [address]  [place] [address]    Lab Hours  Monday - Friday 7:30am - 4pm (closed 12:15pm - 1pm)    X-ray Hours  Call [phone] for hours or to schedule      [facility] [place]  [address]  [place] [address]    Lab Hours  Monday - Friday 8am - 4:30pm (closed 12:15pm - 1pm)    X-ray Hours  None at this location

## 2025-04-28 ENCOUNTER — TELEPHONE (OUTPATIENT)
Facility: CLINIC | Age: 45
End: 2025-04-28

## 2025-04-28 NOTE — TELEPHONE ENCOUNTER
1st attempt to schedule telephone colon screening.     Left message to call back.     Plan to await call back or follow up.     [facility] Staff     Please schedule TCS appointment upon return call.     Thank you!

## 2025-05-01 ENCOUNTER — OFFICE VISIT (OUTPATIENT)
Dept: DERMATOLOGY CLINIC | Facility: CLINIC | Age: 45
End: 2025-05-01

## 2025-05-01 DIAGNOSIS — B02.9 HERPES ZOSTER WITHOUT COMPLICATION: ICD-10-CM

## 2025-05-01 DIAGNOSIS — L90.5 SCAR CONDITION AND FIBROSIS OF SKIN: Primary | ICD-10-CM

## 2025-05-01 PROCEDURE — 99213 OFFICE O/P EST LOW 20 MIN: CPT | Performed by: STUDENT IN AN ORGANIZED HEALTH CARE EDUCATION/TRAINING PROGRAM

## 2025-05-01 NOTE — PROGRESS NOTES
Established Patient    Referred by: No referring provider defined for this encounter.    CHIEF COMPLAINT: Lesion of concern     HISTORY OF PRESENT ILLNESS: Milla Grayson is a 45 year old female here for evaluation of lesion of concern.    1. Cold Sore/Lesion  Location: Rt upper lip  Duration: Appeared 03/10/25 and fell off 03/25/25  Bleeding, growing, changing?: No  Scaly?:No    Itchy?:No    Current treatment: Vaseline  Past treatments: Valacyclovir 100 mg to rule out shingles    2.  Red spot that was previously swelled up and walk in clinic mentioned it was Impetigo  Location: Rt lower face cheek  Duration: Since 03/12/25  Bleeding, growing, changing?: No  Scaly?:No    Itchy?:No    Current treatment: Mupirocin Ointment  Past treatments: Valacyclovir 100 mg to rule out shingles    Personal Dermatologic History  History of skin cancer: No  History of  atypical moles: No    FAMILY HISTORY:  History of melanoma: No    Past Medical History  Past Medical History[1]    REVIEW OF SYSTEMS:  Constitutional: Denies fever, chills, unintentional weight loss.   Skin as per HPI    Medications  Current Medications[2]    PHYSICAL EXAM:  General: awake, alert, no acute distress  Neuropsych: appropriate mood and affect  Eyes: Sclerae anicteric, without conjunctival injection, eyelids unremarkable  Skin: Skin exam was performed today including the following: face. Pertinent findings include:   - with a sclerotic papule     ASSESSMENT & PLAN:  Pathophysiology of diagnoses discussed with patient.  Therapeutic options reviewed. Risks, benefits, and alternatives discussed with patient. Instructions reviewed at length.    #Scar  - recommend OTC med-derma    #Shingles- clear today  - recommend shingles vaccine in 3-6 months     Return to clinic: as needed or sooner if something concerning arises     Mo Mcdonald MD    By signing my name below, Mishel HUNT MA,  attest that this documentation has been prepared under the direction and in  the presence of Mo Mcdonald MD.   Electronically Signed: Mishel WALTER MA, 5/1/2025, 3:00 PM.    I, Mo Mcdonald MD,  personally performed the services described in this documentation. All medical record entries made by the scribe were at my direction and in my presence.  I have reviewed the chart and agree that the record reflects my personal performance and is accurate and complete.  Mo Mcdonald MD, 5/1/2025, 3:20 PM         [1]   Past Medical History:   Ovarian cyst    ovarian cyst with 1st pregnancy - removed    Pure hypercholesterolemia   [2]   Current Outpatient Medications   Medication Sig Dispense Refill    valACYclovir 1 G Oral Tab Take 2 tablets (2,000 mg total) by mouth daily. 2 tablet 3    Multiple Vitamins-Minerals (MULTI-VITAMIN/MINERALS) Oral Tab Take 1 tablet by mouth daily.      Ascorbic Acid (VITAMIN C) 1000 MG Oral Tab Take 1 tablet (1,000 mg total) by mouth daily.

## 2025-05-14 ENCOUNTER — LAB ENCOUNTER (OUTPATIENT)
Dept: LAB | Age: 45
End: 2025-05-14
Attending: FAMILY MEDICINE
Payer: COMMERCIAL

## 2025-05-14 DIAGNOSIS — E66.3 OVERWEIGHT (BMI 25.0-29.9): ICD-10-CM

## 2025-05-14 DIAGNOSIS — Z00.00 HEALTH MAINTENANCE EXAMINATION: ICD-10-CM

## 2025-05-14 DIAGNOSIS — E78.00 PURE HYPERCHOLESTEROLEMIA: ICD-10-CM

## 2025-05-14 LAB
ALBUMIN SERPL-MCNC: 4.5 G/DL (ref 3.2–4.8)
ALBUMIN/GLOB SERPL: 1.6 {RATIO} (ref 1–2)
ALP LIVER SERPL-CCNC: 68 U/L (ref 37–98)
ALT SERPL-CCNC: 15 U/L (ref 10–49)
ANION GAP SERPL CALC-SCNC: 7 MMOL/L (ref 0–18)
AST SERPL-CCNC: 17 U/L (ref ?–34)
BASOPHILS # BLD AUTO: 0.03 X10(3) UL (ref 0–0.2)
BASOPHILS NFR BLD AUTO: 0.3 %
BILIRUB SERPL-MCNC: 0.6 MG/DL (ref 0.3–1.2)
BUN BLD-MCNC: 14 MG/DL (ref 9–23)
BUN/CREAT SERPL: 17.9 (ref 10–20)
CALCIUM BLD-MCNC: 8.9 MG/DL (ref 8.7–10.4)
CHLORIDE SERPL-SCNC: 104 MMOL/L (ref 98–112)
CHOLEST SERPL-MCNC: 214 MG/DL (ref ?–200)
CO2 SERPL-SCNC: 28 MMOL/L (ref 21–32)
CREAT BLD-MCNC: 0.78 MG/DL (ref 0.55–1.02)
DEPRECATED RDW RBC AUTO: 48.4 FL (ref 35.1–46.3)
EGFRCR SERPLBLD CKD-EPI 2021: 95 ML/MIN/1.73M2 (ref 60–?)
EOSINOPHIL # BLD AUTO: 0.04 X10(3) UL (ref 0–0.7)
EOSINOPHIL NFR BLD AUTO: 0.4 %
ERYTHROCYTE [DISTWIDTH] IN BLOOD BY AUTOMATED COUNT: 13.7 % (ref 11–15)
EST. AVERAGE GLUCOSE BLD GHB EST-MCNC: 111 MG/DL (ref 68–126)
FASTING PATIENT LIPID ANSWER: NO
FASTING STATUS PATIENT QL REPORTED: NO
GLOBULIN PLAS-MCNC: 2.9 G/DL (ref 2–3.5)
GLUCOSE BLD-MCNC: 115 MG/DL (ref 70–99)
HBA1C MFR BLD: 5.5 % (ref ?–5.7)
HCT VFR BLD AUTO: 38.8 % (ref 35–48)
HDLC SERPL-MCNC: 60 MG/DL (ref 40–59)
HGB BLD-MCNC: 12.7 G/DL (ref 12–16)
IMM GRANULOCYTES # BLD AUTO: 0.02 X10(3) UL (ref 0–1)
IMM GRANULOCYTES NFR BLD: 0.2 %
LDLC SERPL CALC-MCNC: 133 MG/DL (ref ?–100)
LYMPHOCYTES # BLD AUTO: 3.15 X10(3) UL (ref 1–4)
LYMPHOCYTES NFR BLD AUTO: 33.3 %
MCH RBC QN AUTO: 31.3 PG (ref 26–34)
MCHC RBC AUTO-ENTMCNC: 32.7 G/DL (ref 31–37)
MCV RBC AUTO: 95.6 FL (ref 80–100)
MONOCYTES # BLD AUTO: 0.6 X10(3) UL (ref 0.1–1)
MONOCYTES NFR BLD AUTO: 6.3 %
NEUTROPHILS # BLD AUTO: 5.62 X10 (3) UL (ref 1.5–7.7)
NEUTROPHILS # BLD AUTO: 5.62 X10(3) UL (ref 1.5–7.7)
NEUTROPHILS NFR BLD AUTO: 59.5 %
NONHDLC SERPL-MCNC: 154 MG/DL (ref ?–130)
OSMOLALITY SERPL CALC.SUM OF ELEC: 289 MOSM/KG (ref 275–295)
PLATELET # BLD AUTO: 235 10(3)UL (ref 150–450)
POTASSIUM SERPL-SCNC: 3.9 MMOL/L (ref 3.5–5.1)
PROT SERPL-MCNC: 7.4 G/DL (ref 5.7–8.2)
RBC # BLD AUTO: 4.06 X10(6)UL (ref 3.8–5.3)
SODIUM SERPL-SCNC: 139 MMOL/L (ref 136–145)
TRIGL SERPL-MCNC: 120 MG/DL (ref 30–149)
VLDLC SERPL CALC-MCNC: 22 MG/DL (ref 0–30)
WBC # BLD AUTO: 9.5 X10(3) UL (ref 4–11)

## 2025-05-14 PROCEDURE — 36415 COLL VENOUS BLD VENIPUNCTURE: CPT

## 2025-05-14 PROCEDURE — 83036 HEMOGLOBIN GLYCOSYLATED A1C: CPT

## 2025-05-14 PROCEDURE — 80061 LIPID PANEL: CPT

## 2025-05-14 PROCEDURE — 85025 COMPLETE CBC W/AUTO DIFF WBC: CPT

## 2025-05-14 PROCEDURE — 80053 COMPREHEN METABOLIC PANEL: CPT

## 2025-06-06 ENCOUNTER — HOSPITAL ENCOUNTER (OUTPATIENT)
Dept: MAMMOGRAPHY | Facility: HOSPITAL | Age: 45
Discharge: HOME | End: 2025-06-06
Attending: FAMILY MEDICINE
Payer: COMMERCIAL

## 2025-06-06 DIAGNOSIS — Z00.00 HEALTH MAINTENANCE EXAMINATION: ICD-10-CM

## 2025-06-06 PROCEDURE — 77063 BREAST TOMOSYNTHESIS BI: CPT | Performed by: FAMILY MEDICINE

## 2025-06-06 PROCEDURE — 77067 SCR MAMMO BI INCL CAD: CPT | Performed by: FAMILY MEDICINE

## 2025-06-09 ENCOUNTER — NURSE ONLY (OUTPATIENT)
Facility: CLINIC | Age: 45
End: 2025-06-09

## 2025-06-09 DIAGNOSIS — Z12.11 SCREENING FOR COLON CANCER: Primary | ICD-10-CM

## 2025-06-09 NOTE — PROGRESS NOTES
GI Staff:  TCS Colon Screening Orders    Please schedule: Colonoscopy [id number] with MAC    Please send split dose Golytely bowel prep - needs to be sent once scheduled    Diagnosis: Colon Screening Z12.11     Medication adjustments: Hold vitamins for 14 days prior to procedure     >>>Please inform patient if new medications are started after scheduling procedure they need to call clinic to notify us.

## 2025-06-09 NOTE — PROGRESS NOTES
Meets TCS criteria and appropriate to proceed with scheduling  Medications, pharmacy, and allergies reviewed.   Age 45-76 y/o: Yes  › MD preference: None  › Insurance: [facility] HMO  › Last pcp visit: Dr. Quezada 4/24/25  › Last CBC: 5/14/25  › Last FOBT/Cologuard (if applicable): N/A  › H/W: 5'1 / 153 lb  › BMI: 28.92    Special comments/notes: None  Telephone Colon Screening Questionnaire Yes No   Are you currently experiencing any GI symptoms [] [x]   If yes, explain     Rectal bleeding [] [x]   Black stool [] [x]   Dysphagia or food \"feeling stuck\" when eating [] [x]   Intractable vomiting [] [x]   Unexplained weight loss [] [x]   First colonoscopy [x] []   Family history of colon cancer [] [x]   Any issues with anesthesia [] [x]   If yes, explain      Any recent complaints related to chest pain &/or shortness of breath [] [x]   Referred to a cardiologist?  [] [x]   If yes, explain      History of respiratory issues/oxygen/ESE/COPD [] [x]   CPAP/BiPAP     History of devices (pacemaker/defibrillator) [] [x]   History of heart attack &/or stroke [] [x]   If yes, in the last 12 months?   Stent placement?  [] [x]     Medication Reconciliation  Yes  No   Anticoagulation (blood thinners)  [] [x]   Diuretics  [] [x]   ACE/ARB's  [] [x]   Diabetic medication (oral/insulin)  [] [x]   Weight loss medication (phentermine/vyvanse/saxsenda) [] [x]   Iron/vitamin E/herbal/multivitamin supplements [x] []   Usage of marijuana/CBD/vape products  [] [x]

## 2025-06-12 NOTE — PROGRESS NOTES
Scheduled for:  Colonoscopy [address]  Provider Name:   Sobeida  Date:  9/26/2025  Location:  [facility]  Sedation:  MAC  Time:  11:25 am. (pt is aware that ENDO will call the day before the procedure to confirm arrival time)  Prep:  Golytely. Bowel prep was sent to pharmacy on file.  Meds/Allergies Reconciled?:  yes  Diagnosis with codes:    CRC screening Z12.11  Was patient informed to call insurance with codes (Y/N):  Yes  Referral sent?:  Referral was sent at the time of electronic surgical scheduling.  EM or [facility] notified?:  I sent an electronic request to Endo Scheduling and received a confirmation today.  Medication Orders:  Patient is aware to NOT take iron pills, herbal meds and diet supplements for 2 weeks before exam. Also to NOT take any form of alcohol, recreational drugs and any forms of ED meds 24-72 hours before exam.   Misc Orders:  N/A   Further instructions given by staff:  I discussed the prep instructions with the patient which she verbally understood. Patient is aware I will be sending prep instructions via My Chart.

## 2025-07-22 NOTE — PROGRESS NOTES
FAMILY MEDICINE CLINIC NOTE    HPI  Milla Grayson is a 45 year old female presenting for            #Foot pain  -bilateral  -started 1 month ago   -started using in soles   -discomfort in the morning    #Cold sores  -history of cold sores  -valacyclovir as needed    #Neck  -reports neck initially felt slightly swollen - 1-2 months ago  -felt on inside  -no issues at this time   -no trouble breathing      #Skin lesion----resolved  -R upper mouth  -was seen at [facility] 3/12 - thoughts for impetigo, mupirocin  -was seen at immediate care 3/16 - thoughts for possible HSV or shingles, valacyclovir  -HSV negative  -saw dermatology Pedro Rowe - cold sore and impetigo, continue valtrex and mupirocin  -notes that had tingling on right face   -reports had a rash   -dermatology - Dr Mcdonald - thought shingles        ----other    #Epidermal cyst  -papule on right chin  -saw dermatology in the past - was told a cyst  -has grown again   -dermatology Dr Mcdonald  -referred to ENT Dr Jeffers - pt does not desire removal, does not desire to deal with scar         #Visual change  -reports vision has changed over the years  -saw optometrist   -with bifocals now     #HLD  -lifestyle modifications      ROS  GENERAL: No fever/chills, no recent weight loss  HEENT: No visual changes, no changes in hearing, no sore throats  NECK: No pain, no swelling  RESP: No cough, no SOB  CV: No chest pain, no palpitations  GI: No abd pain, no N/V/D  MSK: No edema  SKIN: No new rashes  NEURO: No numbness, no tingling, no headaches    HEALTH MAINTENANCE  Health Maintenance Topics with due status: Overdue       Topic Date Due    Colorectal Cancer Screening Never done    COVID-19 Vaccine 09/01/2024       ALLERGIES  Allergies[1]    MEDICATIONS  Current Medications[2]    ACTIVE PROBLEMS  Problem List[3]    PAST MEDICAL HISTORY  Past Medical History[4]    PAST SOCIAL HISTORY  Social History     Socioeconomic History    Marital status:      Spouse  name: Not on file    Number of children: Not on file    Years of education: Not on file    Highest education level: Not on file   Occupational History    Not on file   Tobacco Use    Smoking status: Never    Smokeless tobacco: Never   Vaping Use    Vaping status: Not on file   Substance and Sexual Activity    Alcohol use: No    Drug use: No    Sexual activity: Yes     Partners: Male   Other Topics Concern    Grew up on a farm Not Asked    History of tanning Not Asked    Outdoor occupation Not Asked    Breast feeding No    Reaction to local anesthetic No    Pt has a pacemaker No    Pt has a defibrillator No   Social History Narrative    Relationships:  - Norman*     Children: 2 boys - Norman (20M), Cameron (17M)    Pets: None    School: N/A    Work:  for [employer]    Origin: From [place]. Came to [place] in 1989.     Interests: Enjoys being at home, listening at home, watching movies.     Spiritual: Adventism - important.          Social Drivers of Health     Food Insecurity: Not on file   Transportation Needs: Not on file   Stress: Not on file   Housing Stability: At Risk (8/18/2023)    Received from [facility]     Living Situation: Not on file     Housing Problems: Not on file       PAST SURGICAL HISTORY  Past Surgical History[5]    PAST FAMILY HISTORY  Family History[6]      PHYSICAL EXAM  Vitals:    07/23/25 1442   BP: 122/70   Pulse: 78   Temp: 97.3 °F (36.3 °C)   SpO2: 99%   Weight: 142 lb (64.4 kg)      Body mass index is 26.83 kg/m².    GENERAL: NAD  HEENT: Moist mucous membranes, cent and non-bulging  NECK: Supple, non-tender. No thyromegaly, no palpable thyroid nodules. No swelling.   RESP: Non-labored respirations, CTAB, no wheezing, no rales, no rhonchi  CV: RRR, no murmurs  MSK: No edema. +tenderness to palpation to the plantar aspect of the bilateral feet, towards the ankle.   SKIN: Warm and dry, no rashes currently  NEURO: Answering questions  appropriately    LABS  Lab Results   Component Value Date    WBC 9.5 05/14/2025    HGB 12.7 05/14/2025    HCT 38.8 05/14/2025    .0 05/14/2025    NEPERCENT 59.5 05/14/2025    LYPERCENT 33.3 05/14/2025    MOPERCENT 6.3 05/14/2025    EOPERCENT 0.4 05/14/2025    BAPERCENT 0.3 05/14/2025    NE 5.62 05/14/2025    LYMABS 3.15 05/14/2025    MOABSO 0.60 05/14/2025    EOABSO 0.04 05/14/2025    BAABSO 0.03 05/14/2025       Lab Results   Component Value Date     05/14/2025    K 3.9 05/14/2025     05/14/2025    CO2 28.0 05/14/2025    ANIONGAP 7 05/14/2025    BUN 14 05/14/2025    CREATSERUM 0.78 05/14/2025    BUNCREA 17.9 05/14/2025     (H) 05/14/2025    CA 8.9 05/14/2025    OSMOCALC 289 05/14/2025    ALT 15 05/14/2025    AST 17 05/14/2025    ALKPHO 68 05/14/2025    BILT 0.6 05/14/2025    TP 7.4 05/14/2025    ALB 4.5 05/14/2025    GLOBULIN 2.9 05/14/2025    ELECTAG 1.6 05/14/2025    FASTING No 05/14/2025    FASTING No 05/14/2025         Lab Results   Component Value Date    CHOLEST 214 (H) 05/14/2025    TRIG 120 05/14/2025    HDL 60 (H) 05/14/2025     (H) 05/14/2025    VLDL 22 05/14/2025    NONHDLC 154 (H) 05/14/2025        DIAGNOSTICS      ASSESSMENT/PLAN  Problem List Items Addressed This Visit          Musculoskeletal and Injuries    Plantar fasciitis - Primary    Patient with plantar fasciitis of the bilateral feet.  Stretches and exercises provided  Can take tylenol or advil as needed  If without gradual improvement, can notify me for referrals to physical therapy  Wear shoes with good foot support  Follow up as needed            Infectious Diseases    Cold sore    Patient with a history of cold sores  Can use valacylovir 2g twice for one day as needed for future cold sores.  Follow up as needed         Relevant Medications    valACYclovir 1 G Oral Tab       Health Encounters    Health maintenance examination    No swelling or abnormality seen of the neck  Can monitor            Return if  symptoms worsen or fail to improve.    This is a Header Test   Topic Date Due    Annual Physical  2026        Tawanda Quezada MD  Family Medicine           Pre-chartin minutes  Reviewing/obtainin minutes  Medical Exam:1 minutes  Counseling/education: 3 minutes  Notes: 2 minutes  Referring/communicatin minutes  Care coordination: 0 minutes    My total time spent caring for the patient on the day of the encounter: 12 minutes         [1] No Known Allergies  [2]   Current Outpatient Medications   Medication Sig Dispense Refill    valACYclovir 1 G Oral Tab Take 2 tablets (2,000 mg total) by mouth every 12 (twelve) hours. 4 tablet 3    Multiple Vitamins-Minerals (MULTI-VITAMIN/MINERALS) Oral Tab Take 1 tablet by mouth in the morning.      Ascorbic Acid (VITAMIN C) 1000 MG Oral Tab Take 1 tablet (1,000 mg total) by mouth in the morning.     [3]   Patient Active Problem List  Diagnosis    Pure hypercholesterolemia    Health maintenance examination    Epidermal cyst    Overweight (BMI 25.0-29.9)    Visual changes    Cold sore    Plantar fasciitis   [4]   Past Medical History:   Ovarian cyst    ovarian cyst with 1st pregnancy - removed    Pure hypercholesterolemia   [5]   Past Surgical History:  Procedure Laterality Date    Appendectomy  1998      ,     Cholecystectomy  2012    Removal of ovarian cyst(s)     [6]   Family History  Problem Relation Age of Onset    Hypertension Mother     Hyperlipidemia Mother     Diabetes Mother     Hypertension Father     Hyperlipidemia Father     Diabetes Father     No Known Problems Sister     No Known Problems Sister     No Known Problems Maternal Grandmother     Cancer Maternal Grandfather         Stomach    Cancer Paternal Grandmother         Unspecified type    No Known Problems Paternal Grandfather     Colon Cancer Neg     Breast Cancer Neg     Ovarian Cancer Neg     Prostate Cancer Neg     Pancreatic Cancer Neg

## 2025-07-23 ENCOUNTER — OFFICE VISIT (OUTPATIENT)
Dept: INTERNAL MEDICINE CLINIC | Facility: CLINIC | Age: 45
End: 2025-07-23
Payer: COMMERCIAL

## 2025-07-23 VITALS
DIASTOLIC BLOOD PRESSURE: 70 MMHG | TEMPERATURE: 97 F | OXYGEN SATURATION: 99 % | SYSTOLIC BLOOD PRESSURE: 122 MMHG | WEIGHT: 142 LBS | BODY MASS INDEX: 27 KG/M2 | HEART RATE: 78 BPM

## 2025-07-23 DIAGNOSIS — B00.1 COLD SORE: ICD-10-CM

## 2025-07-23 DIAGNOSIS — Z00.00 HEALTH MAINTENANCE EXAMINATION: ICD-10-CM

## 2025-07-23 DIAGNOSIS — M72.2 PLANTAR FASCIITIS: Primary | ICD-10-CM

## 2025-07-23 PROCEDURE — 99213 OFFICE O/P EST LOW 20 MIN: CPT | Performed by: FAMILY MEDICINE

## 2025-07-23 PROCEDURE — 3078F DIAST BP <80 MM HG: CPT | Performed by: FAMILY MEDICINE

## 2025-07-23 PROCEDURE — G2211 COMPLEX E/M VISIT ADD ON: HCPCS | Performed by: FAMILY MEDICINE

## 2025-07-23 PROCEDURE — 3074F SYST BP LT 130 MM HG: CPT | Performed by: FAMILY MEDICINE

## 2025-07-23 RX ORDER — VALACYCLOVIR HYDROCHLORIDE 1 G/1
2000 TABLET, FILM COATED ORAL EVERY 12 HOURS SCHEDULED
Qty: 4 TABLET | Refills: 3 | Status: SHIPPED | OUTPATIENT
Start: 2025-07-23

## 2025-07-23 NOTE — ASSESSMENT & PLAN NOTE
Patient with a history of cold sores  Can use valacylovir 2g twice for one day as needed for future cold sores.  Follow up as needed

## 2025-07-23 NOTE — PATIENT INSTRUCTIONS
PATIENT INSTRUCTIONS    Thank you for seeing me today, it was a pleasure taking care of you.  Please check out at the  and schedule a follow up appointment.  Return if symptoms worsen or fail to improve.  Please remember that the preferred luis alfredo period for appointments is 5 minutes. This is to help maximize the amount of time that we can spend together at our visits.    Valtrex 2G twice daily for one day as needed  Stretches and exercises for the feet  Tylenol ad advil as needed     Best,  Dr. Quezada

## 2025-07-23 NOTE — ASSESSMENT & PLAN NOTE
Patient with plantar fasciitis of the bilateral feet.  Stretches and exercises provided  Can take tylenol or advil as needed  If without gradual improvement, can notify me for referrals to physical therapy  Wear shoes with good foot support  Follow up as needed

## (undated) NOTE — Clinical Note
Thank you for the kind referral. Please feel free to reach out with any questions.   Mo Mcdonald MD

## (undated) NOTE — LETTER
Mo Mcdonald Md  [address] [place] [id number]       02/02/24        Patient: Milla Grayson   YOB: 1980   Date of Visit: 2/2/2024       Dear  Dr. Tamara MD,      Thank you for referring Milla Grayson to my practice.  Please find my assessment and plan below.        ASSESSMENT AND PLAN    1. Epidermal cyst of face  1 cm cyst of the lower face just above the jawline on the right.  We did discuss excising this under local anesthesia.  She does understand the risk of surgery to include but not be limited to postoperative pain as well as scar formation.  She states understanding of these risks accepts these risks and wishes to proceed.  We will set this up at her convenience               Sincerely,   Eliu Jeffers MD   [facility] MEDICAL [facility], [address] [address]    Document electronically generated by:  Eliu Jeffers MD

## (undated) NOTE — Clinical Note
04/12/2017        Maury Chaudhari  [address] [facility]      Dear Saad Armando records indicate that you have outstanding lab work and or testing that was ordered for you and has not yet been completed:       Allergy Adult Foof Profile,

## (undated) NOTE — Clinical Note
04/12/2017        Marilynn Cervantes  [address] [facility]      Dear Oscar Aguayo records indicate that you have outstanding lab work and or testing that was ordered for you and has not yet been completed:       Allergy Adult Foof Profile,

## (undated) NOTE — MR AVS SNAPSHOT
[address] [facility] [address] [facility] [id number]  [phone]               Thank you for choosing us for your health care visit with Sushila Garcia DO.   We are glad to serve you and happy to provide you with this beltran Commonly known as:  KENALOG                Where to Get Your Medications      These medications were sent to [address] [place] [address] [facility] [place], [address], [address]     Phone:  [phone]    - [facility]